# Patient Record
Sex: FEMALE | Race: BLACK OR AFRICAN AMERICAN | NOT HISPANIC OR LATINO | Employment: UNEMPLOYED | URBAN - METROPOLITAN AREA
[De-identification: names, ages, dates, MRNs, and addresses within clinical notes are randomized per-mention and may not be internally consistent; named-entity substitution may affect disease eponyms.]

---

## 2017-01-04 ENCOUNTER — TELEPHONE (OUTPATIENT)
Dept: ENDOSCOPY | Facility: HOSPITAL | Age: 62
End: 2017-01-04

## 2017-01-23 ENCOUNTER — OFFICE VISIT (OUTPATIENT)
Dept: GASTROENTEROLOGY | Facility: CLINIC | Age: 62
End: 2017-01-23
Payer: MEDICAID

## 2017-01-23 VITALS
BODY MASS INDEX: 28.95 KG/M2 | RESPIRATION RATE: 18 BRPM | HEIGHT: 66 IN | HEART RATE: 80 BPM | SYSTOLIC BLOOD PRESSURE: 115 MMHG | DIASTOLIC BLOOD PRESSURE: 79 MMHG | WEIGHT: 180.13 LBS

## 2017-01-23 DIAGNOSIS — K31.89 RETAINED FOOD IN STOMACH: ICD-10-CM

## 2017-01-23 DIAGNOSIS — K21.9 GASTROESOPHAGEAL REFLUX DISEASE WITHOUT ESOPHAGITIS: Primary | ICD-10-CM

## 2017-01-23 PROCEDURE — 99214 OFFICE O/P EST MOD 30 MIN: CPT | Mod: PBBFAC | Performed by: NURSE PRACTITIONER

## 2017-01-23 PROCEDURE — 99999 PR PBB SHADOW E&M-EST. PATIENT-LVL IV: CPT | Mod: PBBFAC,,, | Performed by: NURSE PRACTITIONER

## 2017-01-23 PROCEDURE — 99214 OFFICE O/P EST MOD 30 MIN: CPT | Mod: S$PBB,,, | Performed by: NURSE PRACTITIONER

## 2017-01-23 NOTE — PROGRESS NOTES
[facility] Note    Reason for Visit:  Gastroesophageal reflux    PCP: Primary Doctor No     Referring MD:     HPI:    This is a 61 y.o. female here for f/u evaluation of gastroesophageal reflux and dysphagia.  I saw Ms. Stevens in November for worsening of reflux. She is a former pt of [facility], but had to switch to [facility] d/t insurance coverage issues. EGD last month revealed a normal esophagus, a 2 cm hiatal hernia, a normal duodenum and a medium amount of food residue in the stomach. She has been taking 40 mg of omeprazole once daily on an empty stomach 1 hr before breakfast with improvement in s/s. The dysphagia has resolved and she has breakthrough reflux occurring less than weekly. In fact, she has not had any breakthrough s/s for the last 21 days since being on a fast from unhealthy foods.     Abdominal pain - no  NSAID usage - advil for back pain once weekly.  Hx of BC goody powders 1-2 times per month for HAs-last usage one month ago. Previously advised to avoid.  Bowel habits - hx constipation. Currently with 1-2 formed BM/day taking 17 g Miralax once daily.   GI bleeding - denies hematochezia, hematemesis, melena, BRBPR, black/tarry stools, and coffee ground emesis    ROS:  Constitutional: No fevers, no chills, No unintentional weight loss, no fatigue,   ENT: + allergies. Will be seeing ENT soon  CV: No chest pain, no palpitations, no perif. edema, no sob on exertion  Pulm: No cough, No shortness of breath, no wheezes, no sputum  Ophtho: No vision changes  GI: see HPI; also no nausea, no vomiting, no change in appetite  Derm: No rash  Heme: No lymphadenopathy, No bruising  MSK: No arthritis, + muscle pain (back), no muscle weakness  : No dysuria, No hematuria  Endo: No hot or cold intolerance  Neuro: No syncope, No seizure,     Medical History:  has a past medical history of GERD (gastroesophageal reflux disease); HTN (hypertension); MVP (mitral valve prolapse); and Nodules of  vocal cords.    Surgical History:  has a past surgical history that includes Finger surgery (Right); Colonoscopy; and Esophagogastroduodenoscopy.    Family History: family history includes Breast cancer in her mother and sister; Colon cancer in her sister. There is no history of Vaginal cancer, Endometrial cancer, Cervical cancer, Ovarian cancer, Cancer, Esophageal cancer, Stomach cancer, Rectal cancer, Irritable bowel syndrome, Crohn's disease, Ulcerative colitis, or Celiac disease..     Social History:  reports that she has quit smoking. Her smoking use included Cigarettes. She does not have any smokeless tobacco history on file. She reports that she drinks about 0.6 oz of alcohol per week  She reports that she does not use illicit drugs.    Review of patient's allergies indicates:   Allergen Reactions    Penicillins        Current Outpatient Prescriptions   Medication Sig    atorvastatin (LIPITOR) 40 MG tablet Take 40 mg by mouth once daily.    benzonatate (TESSALON) 200 MG capsule Take 200 mg by mouth 3 (three) times daily as needed for Cough.    clotrimazole (LOTRIMIN) 1 % cream Apply topically 2 (two) times daily.    conj estrogens-bazedoxifene (DUAVEE) 0.45-20 mg Tab Take by mouth once daily.    diazePAM (VALIUM) 5 MG tablet Take 5 mg by mouth every 6 (six) hours as needed for Anxiety.    losartan-hydrochlorothiazide 100-12.5 mg (HYZAAR) 100-12.5 mg Tab Take 1 tablet by mouth once daily.    metoprolol tartrate (LOPRESSOR) 100 MG tablet Take 100 mg by mouth 2 (two) times daily.    omeprazole (PRILOSEC) 40 MG capsule Take 1 capsule (40 mg total) by mouth every morning. 30-45 min before breakfast.    vitamin D 1000 units Tab Take 185 mg by mouth once daily.    oxybutynin (DITROPAN-XL) 10 MG 24 hr tablet Take 1 tablet (10 mg total) by mouth once daily.     No current facility-administered medications for this visit.        Objective Findings:    Vital Signs:  Visit Vitals    /79    Pulse 80     "Resp 18    Ht 5' 6" (1.676 m)    Wt 81.7 kg (180 lb 1.9 oz)    BMI 29.07 kg/m2     Body mass index is 29.07 kg/(m^2).    Physical Exam:  General Appearance: Well appearing in no acute distress  Head:   Normocephalic, without obvious abnormality  Eyes:    No scleral icterus, EOMI  ENT: Neck supple, Lips, mucosa, and tongue normal; teeth and gums normal  Extremities: 2+ radial pulses, no clubbing, cyanosis or edema  Skin: No rash to exposed areas  Neurologic: A&Ox4      Labs:  No results found for: WBC, HGB, HCT, PLT, CHOL, TRIG, HDL, LDLDIRECT, ALT, AST, NA, K, CL, CREATININE, BUN, CO2, TSH, PSA, INR, GLUF, HGBA1C, MICROALBUR      Endoscopy:    12/28/2016 EGD Dr. Mitchell- normal esophagus. Medium amount of food residue in the stomach. 2 cm HH. Normal duodenum.   Per pt, 7/2016 [facility]- polyp removed. Repeat in 5 years  Per pt, 8/22/2014 EGD Dr. Lock @ [facility]- HH. Polyp in the GE junction. Normal duodenum. Normal antrum. Ring in the GE junction.  EGD report reviewed during today's clinic visit. Copies sent to be scanned into pt EMR.  Assessment:  1. Gastroesophageal reflux disease without esophagitis    2. Retained food in stomach           Recommendations:  1. GERD-better. Continue PPI and GERD healthy lifestyle.   2. Retained food in the stomach- GES r/o delayed gastric emptying.     F/u pending results.     Visit time: 30 minutes with greater than 50% of the time spent in face to face pt  discussing the aforementioned diagnoses, recommendations, test results, and answering pt questions.      Order summary:  Orders Placed This Encounter    NM Gastric Emptying         Thank you so much for allowing me to participate in the care of Marta Varghese-Naga Pichardo, APRN,FNP-C    "

## 2017-01-23 NOTE — PATIENT INSTRUCTIONS
"     Probiotics      For IBS and bloating, we typically recommend Align, VSL#3, or [facility] Colon [facility], which can typically be found without a prescription at the pharmacy. Give this at least  a month to work, but can take up to 3 months to repopulate your intestines, so be patient!     VSL#3 is a potent probiotic which can be found in pill form (try [employer] for best price, or VSL3.com). $52 for a 2 month supply. The sachets are for ulcerative colitis and require a prescription.    If you go on the internet, a reputable/powerful probiotic appears to be Super Shield from [employer] at: http://www.bluerockholistics.CarHound/product/pross.asp  You can also choose PB 8 which can be found at [employer] or online.    For diarrhea from travel or antibiotics, we typically recommend Culturelle or Florastor (especially for diarrhea from C diff infection).         General information:  Pick one that has at least seven strains, and five billion CFU (colony forming units).    Products containing probiotics have flooded the market in recent years. As more people seek natural or non-drug ways to maintain their health, manufacturers have responded by offering probiotics in everything from yogurt to chocolate and granola bars to powders and capsules.    Although probiotics have been around for generations - think of the "live active cultures"in several brands of yogurt - the sheer number of products with probiotics now available may overwhelm even the most conscientious of shoppers. In some respects, the industry has grown faster than the research and scientists and doctors are calling for more studies to help determine which probiotics are beneficial and which might be a waste of money.    What Are Probiotics?  Probiotics are living microscopic organisms, or micro-organisms, that scientific research has shown to benefit your health. Most often they are bacteria, but they may also be other organisms such as yeasts. In some " cases they are similar, or the same, as the good bacteria already in your body, particularly those in your gut.    The most common probiotic bacteria come from two groups, Lactobacillus or Bifidobacterium, although it is important to remember that there are many other types of bacteria that are also classified as probiotics. Each group of bacteria has different species and each species has different strains. This is important to remember because different strains have different benefits for different parts of your body. For example, Lactobacillus casei Shirota has been shown to support the immune system and to help food move through the gut, but Lactobacillus bulgaricus may help relieve symptoms of lactose intolerance, a condition in which people cannot digest the lactose found in most milk and dairy products. In general, not all probiotics are the same, and they dont all work the same way.    Scientists are still sorting out exactly how probiotics work. They may:       Boost your immune system by producing antibodies for certain viruses.  Produce substances that prevent infection.  Prevent harmful bacteria from attaching to the gut wall and growing there.  Send signals to your cells to strengthen the mucus in your intestine and help it act as a barrier against infection.  Inhibit or destroy toxins released by certain bad bacteria that can make you sick.  Produce B vitamins necessary for metabolizing the food you eat, warding off anemia caused by deficiencies in B-6 and B-12, and maintaining healthy skin and a healthy nervous system.      Common Uses  Probiotics are most often used to promote digestive health. Because there are different kinds of probiotics, it is important to find the right one for the specific health benefit you seek. Researchers are still studying which probiotic should be used for which health or disease state. Nevertheless, probiotics have been shown to help regulate the movement of food  through the intestine. They also may help treat digestive disease, something of much interest to gastroenterologists. Some of the most common uses for probiotics include the treatment of the following:    Irritable Bowel Syndrome    Irritable bowel syndrome (IBS) is a disorder of movement in the gut. People who have IBS may have diarrhea, constipation or alternating bouts of both. IBS is not caused by injury or illness. Often the only way doctors can diagnose it is to rule out other conditions through testing.    Probiotics, particularly Bifidobacterium infantis, Sacchromyces boulardii, Lactobacillus plantarum and combination probiotics may help regulate how often people with IBS have bowel movements. Probiotics may also help relieve bloating from gas. Bifidobacterium infantis 52319, Lactobacillus plantarum 299V or Bifidobacterium bifidum [id number] have been shown to help regulate bowel movements and relieve bloating, pain and gas.    Inflammatory Bowel Disease    Though some of the symptoms are the same, inflammatory bowel disease (IBD) is different from IBS because in IBD, the intestines become inflamed. Unlike IBS, IBD is a disorder of the immune system. Symptoms include abdominal cramps, pain, diarrhea, weight loss and blood in your stools. In Crohns disease, ulcers may develop anywhere in your intestine including both the large and small bowels. In ulcerative colitis, inflammation only involves the large intestine. Bouts of inflammation may come and go, but in some cases, prescription medication is needed to keep inflammation in check.        Some studies suggest that probiotics may help decrease inflammation and delay the next bout of disease. Ulcerative colitis seems to respond better to probiotics than Crohns disease does. So far, E. coli Nissle, and a mixture of several strains of Lactobacillus, Bifidobacterium and Streptococcus may be most beneficial. Research is continuing to determine which probiotics  are best to treat IBD.    Infectious Diarrhea    Infectious diarrhea is caused by bacteria, viruses or parasites. There is evidence that probiotics such as Lactobacillus rhamnosus and Lactobacillus casei may be particularly helpful in treating diarrhea caused by rotavirus, which often affects babies and small children. Several strains of Lactobacillus and a strain of the yeast Saccharomyces boulardii may help treat and shorten the course of infectious diarrhea.    Antibiotic-Related Diarrhea  Take Lactobacillus rhamnosus GG and/or Saccharomyces boulardii (Culturelle and/or Florastor) six hours after each dose of antibiotics. Increase the dose to 10 billion CFUs per day and continue for one to two weeks after you stop taking the antibiotic.    Sometimes taking an antibiotic can cause infectious diarrhea by reducing the number of good microorganisms in your gut. Then bacteria that normally do not give you any trouble can grow out of control. One such bacterium is Clostridium difficile, which is a major cause of diarrhea in hospitalized patients and people in long-term care facilities like nursing homes. The trouble with Clostridium difficile is that it tends to come back, but there is evidence that taking probiotics such as Saccharomyces boulardii may help prevent this. There is also evidence that taking probiotics when you first start taking an antibiotic may help prevent antibiotic-related diarrhea in the first place.    Travelers Diarrhea    Its possible to get infectious diarrhea when you travel and your body is exposed to new, normally harmless bacteria (travelers diarrhea). Most studies show that probiotics are not very effective in preventing or treating travelers diarrhea in adults. Taking Saccharomyces boulardii weeks before your trip may help prevent travelers diarrhea, which usually comes from ingesting food or water thats been contaminated with bacteria.    Other Uses    Other potential uses for  probiotics include maintaining a healthy mouth, preventing and treating certain skin conditions like eczema, promoting health in the urinary tract and vagina, and preventing allergies (especially in children). There is not as much research about these uses as there is about the benefits of probiotics for your digestive system, and studies have had mixed results. If you have eczema ?Lactobacillus rhamnosus  and Lactobacillus fermentum VRI-003 PCC have been shown to help treat those itchy, scaly skin rashes -- especially in children.If you have a cold ?Some research suggests that Bifidobacterium animalis lactis Bi-07 and Lactobacillus acidophilus NCFM can help reduce the duration and severity of the common cold and flu by enhancing the bodys production of antibodies. If you have a vaginal infection ?Lactobacillus acidophilus, Lactobacillus rhamnosus GR-1 and Lactobacillus reuteri RC-14 have been shown to help prevent and clear up bacterial vaginosis and urinary tract infections in some individuals. Researchers point to Lactobacillus reuteri RC-14 and Lactobacillus rhamnosus GR-1 as the most effective stains to protect against yeast infections as theyre especially adept at colonizing the vaginal environment and fighting off unwelcome bacteria and fungi. If you have bad breath, gingivitis or periodontitis ?A probiotic lozenge or mouthwash might be your best bet. Lactobacillus reuteri LR-1 or LR-2 promote oral health by binding to teeth and gums, preventing plaque formation in the mouth. Research has demonstrated the ability of Weissella cibaria to freshen breath by inhibiting the production of sulfur compounds in the mouth.    Are Probiotics Safe?  It is generally thought that most probiotics are safe, although it is not yet known if they are safe for people with severely impaired immune systems. They may be taken by people without a diagnosed digestive problem. Their safety is evident since they have a long  history of use in dairy foods like yogurt, cheese and milk.    However, you should talk to your doctor before adding these or any other probiotics to your diet. Probiotics might not be appropriate for seniors. Some probiotics may interfere with or interact with medications. Your doctor will be able to help you determine if probiotics are right for you based on your medical history.    Research about the use of probiotics in children has grown in recent years. Although studies have shown that probiotics may help to treat infectious diarrhea in babies and small children, researchers are unsure whether probiotics are particularly helpful for children with Crohns disease or other types of inflammatory bowel disease. Ask your childs pediatrician about probiotics before giving them to your child.    The exception here is breastfeeding. Breast milk stimulates the growth of normal gut organisms that are important for a babys digestive health and developing immune system. That is one reason why doctors strongly encourage mothers to breastfeed their babies.    Overall, scientists agree that more research is necessary before they can make blanket statements about the safety of probiotics in general or about individual groups and strains. Future studies will show whether probiotics can be used to treat diseases, are safe to use for a long time, and if it is possible to take too many probiotics or mix them in inappropriate ways. These studies will also guide us as to which probiotics to use for different disorders.    Keep in mind that probiotics are considered dietary supplements and are not FDA-regulated like drugs. They are not standardized, meaning they are made in different ways by different companies and have different additives. How well a probiotic works may differ from brand to brand and even from batch to batch within the same brand. Probiotics also vary tremendously in their cost, and cost does not necessarily  reflect higher quality.    Side effects may vary, too. The most common are gas and bloating. These are usually mild and temporary. More serious side effects include allergic reactions, either to the probiotics themselves or to other ingredients in the food or supplement.    Choosing a Probiotic  Probiotics are available in yogurt and other dairy products, chocolate and granola bars, juices, powders, and capsules. You can purchase them at your local supermarket or [employer] food store as well as on the Internet. Here are some tips to help you choose.    Check the label. The more information there is on the label, the better. Ideally, the label will tell you the probiotics group, species and strain, and how many of the microorganisms will still be alive on the use-by date. Although some products guarantee how many organisms were present at the time it was manufactured, often it is less clear how many organisms are present when these products are actually consumed.    Call the . Unfortunately, many labels dont say exactly which strain is in the product; many list only the group and the species, such as Lactobacillus acidophilus or Bifidobacterium lactis. If youre planning to take a probiotic for a specific condition, call the company and find out exactly which strains its products contain and what research they have done to support their health claims. You may be able to find this information on their Web site, as well.    Beware of the Internet. If you order products from the Internet, make sure you know the company from which you are ordering. There are plenty of scammers out there who are willing to send you fake products labeled as probiotics. At best, the ingredients could be harmless, like garlic powder. At worst, they could be laced with powerful herbs, prescription medications or illegal drugs. Some companies may simply take your money and disappear.    Stick to well-established companies and  companies you know. The longer a company has been around, the more likely its products have been tested and studied repeatedly and the bigger the reputation the company has to protect. Some manufacturers that have been making products with probiotics for a while are [employer], [employer], [employer], [employer], [employer], [employer], [employer], and [employer].    Storage    One final note: Remember to store your probiotic according to package instructions and make sure the product has a sell-by or expiration date. Probiotics are living organisms. Even if they are dried and dormant, like in a powder or capsule, they must be stored properly or they will die. Some require refrigeration whereas others do not. They also have a shelf-life, so make sure you use them before the expiration date on the package.      NSAIDS are Non Steroidal Anti Inflammatory Drugs taken for pain.   You should stay away from these medications (unless they are advised due to cardiac health issues) as they may cause irritation and/or ulceration in the lining of your stomach.  Ask your primary care physician for an alternative medication.    Powders - BC Powder, Goody powder, Stanback    Ibuprofen which is also known as : Advil, Advil Childrens, Advil Parveen Strength, Advil Liquigel, Advil Migraine, Advil Pediatric, Childrens Ibuprofen Berry, Genpril, IBU, Midol IB, Midol Maximum Strength Cramp Formula, Dolgesic, Motrin Childrens, Motrin IB, Motrin Infant Drops, Motrin Parveen Strength, Motrin Migraine Pain, Nuprin, Migraine Liqui-gels, Ibu-Tab 200, Cap-Profen, Tab-Profen, Profen, Ibuprohm, Childrens Elixsure, IB Pro, Vicoprofen, Combunox, A-G Profen, Actiprofen, Addaprin, Advil Infants Concentrated Drops, Caldolor, Haltran, Q-Profen, Ibifon 600, Ibren, Menadol, Midol Cramps & Bodyaches, Rufen, Saleto-200, Michael, Ultraprin, Uni-Pro, Wal-Profen., Famotidine and Ibuprofen can be found as : Duexis.    Aspirin which has the brand name :  Arthritis Pain, Aspergum, Aspir-Low, Aspirin Lite Coat, Samy Aspirin, Bufferin, Easprin, Ecotrin, Empirin, Fasprin, Genacote, Halfprin, [place] Aspirin, Waldo Aspirin, Stanback Analgesic, Tri-Buffered Aspirin, YSP Aspirin, Zorprin.    Ketoprofen which is  known as : Orudis KT, Oruvail, Actron.    Sulindac which is sold as : Clinoril.    Naproxen is one of the most known drug from NSAIDs list, and is sold as : Aleve, Naprosyn, EC-Naprosyn, Naprelan, Anaprox, All Day Pain Relief, Aflaxen, All Day Relief, Anaprox-DS, Comfort Pac  With Naproxen,  Aleve Caplet, Aleve Easy Open Arthritis, Aleve Gelcap,  Anaprox-DS, EC-Naprosyn, Leader Naproxen Sodium, Midol Extended Relief, Naprelan 375?, Naprelan 500?, Naprelan 750?, Prevacid NapraPac 375,  Prevacid NapraPac, Naprapac, Vimovo.    Etodolac is sold under the brand name : Lodine XL, Lodine.  Fenoprofen which can be found on the market as : Nalfon, Nalfon 200.  Diclofenac which is sold as : Arthrotec, Cataflam, Voltaren, Cambia, Voltaren-XR, Zipsor.  Flurbiprofen sold as : Asaid.  Ketorolac is sold under the brand name : Sprix, Toradol, Toradol IM, Toradol IV/IM.  Piroxicam is found on the market as : Feldene.  Indomethacin which is known as : Indocin SR, Indocin, Indo-[place], Indomethegan, Indocin IV.  Mefenamic Acid sold as : Ponstel.  Meloxicam is sold under the brand name : Mobic.  Nabumetone which is sold as : Relafen.  Oxaprozin which has the brand name : Daypro.  Ketoprofen which has the brand name : Actron, Orudis KT, Orudis, Oruvail.  Meclofenamate sold as : Meclomen.  Tolmetin which can be found on the marked as : Tolectin, Tolectin 600, Tolectin DS.  Salsalate which is sold as : Disalcid.

## 2017-02-03 ENCOUNTER — HOSPITAL ENCOUNTER (OUTPATIENT)
Dept: RADIOLOGY | Facility: HOSPITAL | Age: 62
Discharge: HOME | End: 2017-02-03
Attending: NURSE PRACTITIONER
Payer: MEDICAID

## 2017-02-03 DIAGNOSIS — K21.9 GASTROESOPHAGEAL REFLUX DISEASE WITHOUT ESOPHAGITIS: ICD-10-CM

## 2017-02-03 DIAGNOSIS — K31.89 RETAINED FOOD IN STOMACH: ICD-10-CM

## 2017-02-03 PROCEDURE — 78264 GASTRIC EMPTYING IMG STUDY: CPT | Mod: TC

## 2017-02-03 PROCEDURE — 78264 GASTRIC EMPTYING IMG STUDY: CPT | Mod: 26,,, | Performed by: RADIOLOGY

## 2017-02-06 ENCOUNTER — TELEPHONE (OUTPATIENT)
Dept: GASTROENTEROLOGY | Facility: CLINIC | Age: 62
End: 2017-02-06

## 2017-02-06 NOTE — TELEPHONE ENCOUNTER
Please call Ms. Stevens and let her know her GES was normal. She should f/u yearly (1/2018) for long term PPI use.    Thanks,  Tiff, NP

## 2017-03-06 ENCOUNTER — TELEPHONE (OUTPATIENT)
Dept: UROGYNECOLOGY | Facility: CLINIC | Age: 62
End: 2017-03-06

## 2017-03-06 ENCOUNTER — OFFICE VISIT (OUTPATIENT)
Dept: UROGYNECOLOGY | Facility: CLINIC | Age: 62
End: 2017-03-06
Payer: MEDICAID

## 2017-03-06 VITALS
BODY MASS INDEX: 29.23 KG/M2 | DIASTOLIC BLOOD PRESSURE: 78 MMHG | HEIGHT: 66 IN | SYSTOLIC BLOOD PRESSURE: 122 MMHG | WEIGHT: 181.88 LBS

## 2017-03-06 DIAGNOSIS — N95.2 VAGINAL ATROPHY: ICD-10-CM

## 2017-03-06 DIAGNOSIS — N39.46 MIXED INCONTINENCE: Primary | ICD-10-CM

## 2017-03-06 DIAGNOSIS — N81.11 MIDLINE CYSTOCELE: ICD-10-CM

## 2017-03-06 DIAGNOSIS — N95.0 POST-MENOPAUSAL BLEEDING: ICD-10-CM

## 2017-03-06 DIAGNOSIS — N81.6 RECTOCELE: ICD-10-CM

## 2017-03-06 LAB
BILIRUB SERPL-MCNC: ABNORMAL MG/DL
BLOOD URINE, POC: ABNORMAL
COLOR, POC UA: YELLOW
GLUCOSE UR QL STRIP: ABNORMAL
KETONES UR QL STRIP: ABNORMAL
LEUKOCYTE ESTERASE URINE, POC: 1
NITRITE, POC UA: ABNORMAL
PH, POC UA: 5
PROTEIN, POC: ABNORMAL
SPECIFIC GRAVITY, POC UA: 1.01
UROBILINOGEN, POC UA: ABNORMAL

## 2017-03-06 PROCEDURE — 99213 OFFICE O/P EST LOW 20 MIN: CPT | Mod: S$PBB,,, | Performed by: NURSE PRACTITIONER

## 2017-03-06 PROCEDURE — 99213 OFFICE O/P EST LOW 20 MIN: CPT | Mod: PBBFAC | Performed by: NURSE PRACTITIONER

## 2017-03-06 PROCEDURE — 81002 URINALYSIS NONAUTO W/O SCOPE: CPT | Mod: PBBFAC | Performed by: NURSE PRACTITIONER

## 2017-03-06 PROCEDURE — 99999 PR PBB SHADOW E&M-EST. PATIENT-LVL III: CPT | Mod: PBBFAC,,, | Performed by: NURSE PRACTITIONER

## 2017-03-06 RX ORDER — TERCONAZOLE 4 MG/G
1 CREAM VAGINAL DAILY
COMMUNITY
End: 2018-01-22

## 2017-03-06 RX ORDER — METOPROLOL SUCCINATE 100 MG/1
50 TABLET, EXTENDED RELEASE ORAL DAILY
COMMUNITY

## 2017-03-06 NOTE — TELEPHONE ENCOUNTER
"Spoke with pt and let her know she can print her name where it says "pay to order" and make sure you put your signature on the back. Pt. Voice understanding call ended.      "

## 2017-03-06 NOTE — PATIENT INSTRUCTIONS
1. Prolapse: Stage 2 anterior and posterior vaginal wall prolapse (not very bothersome to patient)  --Pessary # 3 ring without support  Not being used at this time.   PESSARY CARE: Remove pessary as frequently as nightly and as infrequently as every 2-3 weeks. Remove before intercourse. May need to remove before bowel movements if straining dislodges. Wash with soap and water (no ). Replace using small amount of water-based lubricant (like KY jelly) at end being introduced into vagina.    2. 1)  Mixed urinary incontinence, urge > stress:    --Empty bladder every 3 hours.  Empty well: wait a minute, lean forward on toilet.    --Avoid dietary irritants (see sheet).  Keep diary x 3-5 days to determine your irritants.  --KEGELS: do 10 in AM and 10 in PM, holding each x 10 seconds.  When you feel urge to go, STOP, KEGEL, and when urge has passed, then go to bathroom.  Consider PT in future.    --URGE: continue oxybutynin.  For dry mouth: get sour, sugar free lozenge or gum.    --STRESS:  Pessary vs. Sling.     3. Post menopausal bleeding  --pelvic ultrasound ordered  --misses duavee pills often.    4. RTC pending results

## 2017-03-06 NOTE — TELEPHONE ENCOUNTER
----- Message from Leny Leonard sent at 3/6/2017 12:44 PM CST -----  Contact: CHARLES PETER [[id number]]  x_  1st Request  _  2nd Request  _  3rd Request        Who: CHARLES PETER [[id number]]    Why: patient states she needs a call back in regards to a check she received at her visit today     What Number to Call Back: [phone]    When to Expect a call back: (Before the end of the day)   -- if call after 3:00 call back will be tomorrow.

## 2017-03-06 NOTE — PROGRESS NOTES
Urogyn follow up  03/06/2017  .  [facility]  [address] [address]    Marta Stevens  [id number]  1955      Marta Stevens is a 61 y.o.  here for a urogyn follow up.    Last HPI from 11/29/2017  History of Present Illness: 60 Y O F patient of Dr. Lara here for evaluation of urinary incontinence. Patient unable to remove the pessary requesting additional education.         HPI         [facility]       Question    11/1/2016 10:38 AM CDT      General Urogynecology: Are you experiencing the following?          Dysuria (painful urination)   No      Nocturia:  waking up at night to empty your bladder    Yes      If you answered yes to the previous question, how many times does this happen per night?   1-2      Enuresis (urine loss during sleep)   No      Dribbling urine after you urinate   No      Hematuria (urine appears red)   No      Type of stream   Strong      Urinary Incontinence (General): Are you experiencing the following?          Past consultation for incontinence: Have you ever seen someone for the evaluation of incontinence?   Yes      If you answered yes to the previous question, please select all the therapies you have tried.    None of the above      Please note the effectiveness of the therapies.   Ineffective      Need to wear protection to keep clothes dry    No      If you answered yes to the previous question, please mykel the protection you use.    None      If you wear protection, how much wetness is typically on each pad?   Panti liner       If you wear protection, how often do you have to change per day, if applicable?    0      Stress Symptoms: Are you experiencing the following?          Leakage of urine with cough, laugh and/or sneeze   No      If you answered yes to the previous question, what is the frequency in days, weeks and/or months?   Never      Leakage of urine with sex   No      Leakage of urine with bending/ lifting   No      Leakage  "of urine with briskly walking or jogging   No      If you lose urine for any other reason not previously mentioned, please note it below, if applicable.    none      Urge Symptoms: Are you experiencing the following?          Urgency ("got to go" feeling)   Yes      Urge: How frequently do you feel an urge to urinate (feeling like you "gotta go" to the bathroom and can't wait)   Daily      Do you experience a leakage of urine when you have a feeling of urgency?    Yes      Leakage of urine when unaware   No      Past use of anticholinergics (medications used to treat overactive bladder)   No      If you answered yes to the previous question, please mykel the anticholinergics you have used:           Have you ever used Mirbetriq (aka Mirabegron)?    No      Prolapse Symptoms: Are you experiencing any of the following?           Falling out/ Bulging/ Heaviness in the vagina   No      Vaginal/ Abdominal Pain/ Pressure   No      Need to strain/ Push to void   No      Need to wait on the toilet before you void   No      Unusual position to urinate (using your hands to push back the vaginal bulge)   No      Sensation of incomplete emptying   Yes      Past use of pessary device   No      If you answered yes to the previous question, please list the devices you have used below.           Bowel Symptoms: Are you experiencing any of the following?          Constipation   No      Diarrhea    No      Hematochezia (bloody stool)   No      Incomplete evacuation of stool   No      Involuntary loss of formed stool   No      Fecal smearing/urgency   No      Involuntary loss of gas   No      Vaginal Symptoms: Are you experiencing any of the following?           Abnormal vaginal bleeding    No      Vaginal dryness   Yes      Sexually active    No      Dyspareunia (painful intercourse)   No      Estrogen use    No       Changes from last visit:  1)  UI:  (--) ALMA ROSA   (+) UUI  Better with oxybutynin (+) pads:1/day, usually minimum wetness  " Daytime frequency: Q 2-3 hours.  Nocturia: Yes: 1/night.   (--) dysuria,  (--) hematuria,  (--) frequent UTIs.  (+) complete bladder emptying.     2)  POP:  Absent.  Symptoms:(--)  .  (+) vaginal bleeding--- few weeks ago bled x 4 days. (--) vaginal discharge. (--) sexually active.   (+)  Vaginal dryness.  (--) vaginal estrogen use. Using duavee. Reports missing doses frequently    3)  BM:  (--) constipation/straining.  (--) chronic diarrhea. (--) hematochezia.  (--) fecal incontinence.  (--) fecal smearing/urgency.  (--) incomplete evacuation.  Using miralax.    4)Pessary-- not using now      Past Medical History:   Diagnosis Date    GERD (gastroesophageal reflux disease)     HTN (hypertension)     MVP (mitral valve prolapse)     Nodules of vocal cords        Past Surgical History:   Procedure Laterality Date    COLONOSCOPY      ESOPHAGOGASTRODUODENOSCOPY      FINGER SURGERY Right        Current Outpatient Prescriptions   Medication Sig    atorvastatin (LIPITOR) 40 MG tablet Take 40 mg by mouth once daily.    clotrimazole (LOTRIMIN) 1 % cream Apply topically 2 (two) times daily.    conj estrogens-bazedoxifene (DUAVEE) 0.45-20 mg Tab Take by mouth once daily.    diazePAM (VALIUM) 5 MG tablet Take 5 mg by mouth every 6 (six) hours as needed for Anxiety.    losartan-hydrochlorothiazide 100-12.5 mg (HYZAAR) 100-12.5 mg Tab Take 1 tablet by mouth once daily.    metoprolol succinate (TOPROL-XL) 100 MG 24 hr tablet Take 100 mg by mouth once daily.    metoprolol tartrate (LOPRESSOR) 100 MG tablet Take 100 mg by mouth 2 (two) times daily.    omeprazole (PRILOSEC) 40 MG capsule Take 1 capsule (40 mg total) by mouth every morning. 30-45 min before breakfast.    terconazole (TERAZOL 7) 0.4 % Crea Place 1 applicator vaginally once daily.    vitamin D 1000 units Tab Take 185 mg by mouth once daily.    oxybutynin (DITROPAN-XL) 10 MG 24 hr tablet Take 1 tablet (10 mg total) by mouth once daily.     No current  "facility-administered medications for this visit.          ROS:  As per HPI.      Exam  /78 (BP Location: Right arm, Patient Position: Sitting, BP Method: Manual)  Ht 5' 6" (1.676 m)  Wt 82.5 kg (181 lb 14.1 oz)  BMI 29.36 kg/m2  General: alert and oriented, no acute distress  Respiratory: normal respiratory effort  Abd: soft, non-tender, non-distended    Pelvic  Ext. Genitalia: normal external genitalia. Normal bartholin's and skeens glands  Vagina: + atrophy. Normal vaginal mucosa without lesions. No discharge noted.   Non-tender bladder base without palpable mass.  Cervix: no lesions  Uterus:  uterus is normal size, shape, consistency and nontender   Urethra: no masses or tenderness  Urethral meatus: no lesions, caruncle or prolapse.        Impression  1. Mixed incontinence  POCT URINE DIPSTICK WITHOUT MICROSCOPE   2. Post-menopausal bleeding  US Pelvis Comp with Transvag NON-OB (xpd)    CANCELED: US Pelvis Complete Non OB   3. Midline cystocele     4. Rectocele     5. Vaginal atrophy       We reviewed the above issues and discussed options for short-term versus long-term management of her problems.   Plan:        1. Prolapse: Stage 2 anterior and posterior vaginal wall prolapse (not very bothersome to patient)  --Pessary # 3 ring without support  Not being used at this time.   PESSARY CARE: Remove pessary as frequently as nightly and as infrequently as every 2-3 weeks. Remove before intercourse. May need to remove before bowel movements if straining dislodges. Wash with soap and water (no ). Replace using small amount of water-based lubricant (like KY jelly) at end being introduced into vagina.    2. 1)  Mixed urinary incontinence, urge > stress:    --Empty bladder every 3 hours.  Empty well: wait a minute, lean forward on toilet.    --Avoid dietary irritants (see sheet).  Keep diary x 3-5 days to determine your irritants.  --KEGELS: do 10 in AM and 10 in PM, holding each x 10 seconds.  When " you feel urge to go, STOP, KEGEL, and when urge has passed, then go to bathroom.  Consider PT in future.  --URGE: continue oxybutynin.  For dry mouth: get sour, sugar free lozenge or gum.    --STRESS:  Pessary vs. Sling.     3. Post menopausal bleeding  --pelvic ultrasound ordered  --misses duavee pills often.    4. RTC pending results      30 minutes were spent in face to face time with this patient  75 % of this time was spent in counseling and/or coordination of care    OLIMPIA Lai-BC [facility]  Division of Female Pelvic Medicine and Reconstructive Surgery  Department of Obstetrics & Gynecology

## 2017-03-06 NOTE — MR AVS SNAPSHOT
[facility]  [address] [address]  Phone: [phone]                  Marta Stevens   3/6/2017 11:00 AM   Office Visit    Description:  Female : 1955   Provider:  Elda Loweyr NP   Department:  [facility]           Reason for Visit     Pessary Check           Diagnoses this Visit        Comments    Mixed incontinence    -  Primary     Post-menopausal bleeding                To Do List           Future Appointments        Provider Department Dept Phone    3/8/2017 1:15 PM [facility] [address] [facility] [phone]      Goals (5 Years of Data)     None      [facility]     [facility] Nurse Care Line -  Assistance  Registered nurses in the [facility] provide clinical advisement, health education, appointment booking, and other advisory services.  Call for this free service at [phone].             Medications           Message regarding Medications     Verify the changes and/or additions to your medication regime listed below are the same as discussed with your clinician today.  If any of these changes or additions are incorrect, please notify your healthcare provider.        STOP taking these medications     benzonatate (TESSALON) 200 MG capsule Take 200 mg by mouth 3 (three) times daily as needed for Cough.           Verify that the below list of medications is an accurate representation of the medications you are currently taking.  If none reported, the list may be blank. If incorrect, please contact your healthcare provider. Carry this list with you in case of emergency.           Current Medications     atorvastatin (LIPITOR) 40 MG tablet Take 40 mg by mouth once daily.    clotrimazole (LOTRIMIN) 1 % cream Apply topically 2 (two) times daily.    conj estrogens-bazedoxifene (DUAVEE) 0.45-20 mg Tab Take by mouth once daily.    diazePAM (VALIUM) 5 MG tablet Take 5 mg by mouth every 6 (six)  "hours as needed for Anxiety.    losartan-hydrochlorothiazide 100-12.5 mg (HYZAAR) 100-12.5 mg Tab Take 1 tablet by mouth once daily.    metoprolol succinate (TOPROL-XL) 100 MG 24 hr tablet Take 100 mg by mouth once daily.    metoprolol tartrate (LOPRESSOR) 100 MG tablet Take 100 mg by mouth 2 (two) times daily.    omeprazole (PRILOSEC) 40 MG capsule Take 1 capsule (40 mg total) by mouth every morning. 30-45 min before breakfast.    terconazole (TERAZOL 7) 0.4 % Crea Place 1 applicator vaginally once daily.    vitamin D 1000 units Tab Take 185 mg by mouth once daily.    oxybutynin (DITROPAN-XL) 10 MG 24 hr tablet Take 1 tablet (10 mg total) by mouth once daily.           Clinical Reference Information           Your Vitals Were     BP Height Weight BMI       122/78 (BP Location: Right arm, Patient Position: Sitting, BP Method: Manual) 5' 6" (1.676 m) 82.5 kg (181 lb 14.1 oz) 29.36 kg/m2       Blood Pressure          Most Recent Value    BP  122/78      Allergies as of 3/6/2017     Lipitor [Atorvastatin]    Penicillins      Immunizations Administered on Date of Encounter - 3/6/2017     None      Orders Placed During Today's Visit      Normal Orders This Visit    POCT URINE DIPSTICK WITHOUT MICROSCOPE     Future Labs/Procedures Expected by Expires    US Pelvis Complete Non OB  3/6/2017 3/6/2018         3/6/2017 11:52 AM - Rachel Green      Component Results     Component    Color    yellow    Spec Grav    1.015    pH, UA    5    WBC, UA    1    Nitrite    neg    Protein    neg    Glucose, UA    neg    Ketones, UA    neg    Urobilinogen    neg    Bilirubin    neg    Blood, UA    neg            [facility] Sign-Up     Activating your [facility] account is as easy as 1-2-3!     1) Visit [email or web address], select Sign Up Now, enter this activation code and your date of birth, then select Next.  [email or web address]  Expires: 4/20/2017 11:59 AM      2) Create a username and password to use when you visit [facility] in the future and " select a security question in case you lose your password and select Next.    3) Enter your e-mail address and click Sign Up!    Additional Information  If you have questions, please e-mail [email or web address] or call [phone] to talk to our [facility] staff. Remember, [facility] is NOT to be used for urgent needs. For medical emergencies, dial 911.         Instructions       1. Prolapse: Stage 2 anterior and posterior vaginal wall prolapse (not very bothersome to patient)  --Pessary # 3 ring without support  Not being used at this time.   PESSARY CARE: Remove pessary as frequently as nightly and as infrequently as every 2-3 weeks. Remove before intercourse. May need to remove before bowel movements if straining dislodges. Wash with soap and water (no ). Replace using small amount of water-based lubricant (like KY jelly) at end being introduced into vagina.    2. 1)  Mixed urinary incontinence, urge > stress:    --Empty bladder every 3 hours.  Empty well: wait a minute, lean forward on toilet.    --Avoid dietary irritants (see sheet).  Keep diary x 3-5 days to determine your irritants.  --KEGELS: do 10 in AM and 10 in PM, holding each x 10 seconds.  When you feel urge to go, STOP, KEGEL, and when urge has passed, then go to bathroom.  Consider PT in future.    --URGE: continue oxybutynin.  For dry mouth: get sour, sugar free lozenge or gum.    --STRESS:  Pessary vs. Sling.     3. Post menopausal bleeding  --pelvic ultrasound ordered  --misses duavee pills often.    4. RTC pending results       Language Assistance Services     ATTENTION: Language assistance services are available, free of charge. Please call [phone].      ATENCIÓN: Si bennettla tyshawn, tiene a barbour disposición servicios gratuitos de asistencia lingüística. Llame al [phone].     CHÚ Ý: N?u b?n nói Ti?ng Vi?t, có các d?ch v? h? tr? ngôn ng? mi?n phí dành cho b?n. G?i s? [phone].         [facility]  complies with applicable Federal civil rights laws and does not discriminate on the basis of race, color, national origin, age, disability, or sex.

## 2017-03-08 ENCOUNTER — HOSPITAL ENCOUNTER (OUTPATIENT)
Dept: RADIOLOGY | Facility: HOSPITAL | Age: 62
Discharge: HOME | End: 2017-03-08
Attending: NURSE PRACTITIONER
Payer: MEDICAID

## 2017-03-08 DIAGNOSIS — N95.0 POST-MENOPAUSAL BLEEDING: ICD-10-CM

## 2017-03-08 PROCEDURE — 76830 TRANSVAGINAL US NON-OB: CPT | Mod: 26,,, | Performed by: RADIOLOGY

## 2017-03-08 PROCEDURE — 76856 US EXAM PELVIC COMPLETE: CPT | Mod: TC

## 2017-03-08 PROCEDURE — 76856 US EXAM PELVIC COMPLETE: CPT | Mod: 26,,, | Performed by: RADIOLOGY

## 2017-03-14 ENCOUNTER — TELEPHONE (OUTPATIENT)
Dept: UROGYNECOLOGY | Facility: CLINIC | Age: 62
End: 2017-03-14

## 2017-03-14 DIAGNOSIS — R93.89 THICKENED ENDOMETRIUM: Primary | ICD-10-CM

## 2017-03-22 ENCOUNTER — PROCEDURE VISIT (OUTPATIENT)
Dept: UROGYNECOLOGY | Facility: CLINIC | Age: 62
End: 2017-03-22
Payer: MEDICAID

## 2017-03-22 VITALS
SYSTOLIC BLOOD PRESSURE: 120 MMHG | DIASTOLIC BLOOD PRESSURE: 70 MMHG | WEIGHT: 182.13 LBS | HEIGHT: 66 IN | BODY MASS INDEX: 29.27 KG/M2

## 2017-03-22 DIAGNOSIS — R93.89 THICKENED ENDOMETRIUM: ICD-10-CM

## 2017-03-22 DIAGNOSIS — N95.0 PMB (POSTMENOPAUSAL BLEEDING): Primary | ICD-10-CM

## 2017-03-22 DIAGNOSIS — N89.8 VAGINAL DISCHARGE: ICD-10-CM

## 2017-03-22 LAB
B-HCG UR QL: NEGATIVE
C TRACH DNA SPEC QL NAA+PROBE: NOT DETECTED
CTP QC/QA: YES
N GONORRHOEA DNA SPEC QL NAA+PROBE: NOT DETECTED

## 2017-03-22 PROCEDURE — 58100 BIOPSY OF UTERUS LINING: CPT | Mod: PBBFAC | Performed by: NURSE PRACTITIONER

## 2017-03-22 PROCEDURE — 87591 N.GONORRHOEAE DNA AMP PROB: CPT

## 2017-03-22 PROCEDURE — 87480 CANDIDA DNA DIR PROBE: CPT

## 2017-03-22 PROCEDURE — 58100 BIOPSY OF UTERUS LINING: CPT | Mod: S$PBB,,, | Performed by: NURSE PRACTITIONER

## 2017-03-22 PROCEDURE — 81025 URINE PREGNANCY TEST: CPT | Mod: PBBFAC | Performed by: NURSE PRACTITIONER

## 2017-03-22 NOTE — PROCEDURES
Endometrial Biopsy-Future  Date/Time: 3/22/2017 12:31 PM  Performed by: ZEUS VUONG  Authorized by: ZEUS VUONG   Preparation: Patient was prepped and draped in the usual sterile fashion.  Local anesthesia used: no    Anesthesia:  Local anesthesia used: no  Comments: Attempted to perform endometrial biopys.  Copious amount of yellow discharge noted-- cervix friable with genprobe.  Genprobe and affirm collected. Will treat if indicated---will reschedule embx.  OLIMPIA Lai-BC

## 2017-03-22 NOTE — MR AVS SNAPSHOT
[facility]  [address] [address]  Phone: [phone]                  Marta Stevens   3/22/2017 11:00 AM   Procedure visit    Description:  Female : 1955   Provider:  Elda Lowery NP   Department:  [facility]           Reason for Visit     Endometrial Biopsy           Diagnoses this Visit        Comments    PMB (postmenopausal bleeding)    -  Primary     Thickened endometrium         Vaginal discharge                To Do List           Goals (5 Years of Data)     None      [facility]     [facility] Care [facility] -  Assistance  Registered nurses in the [facility] provide clinical advisement, health education, appointment booking, and other advisory services.  Call for this free service at [phone].             Medications           Message regarding Medications     Verify the changes and/or additions to your medication regime listed below are the same as discussed with your clinician today.  If any of these changes or additions are incorrect, please notify your healthcare provider.             Verify that the below list of medications is an accurate representation of the medications you are currently taking.  If none reported, the list may be blank. If incorrect, please contact your healthcare provider. Carry this list with you in case of emergency.           Current Medications     atorvastatin (LIPITOR) 40 MG tablet Take 40 mg by mouth once daily.    clotrimazole (LOTRIMIN) 1 % cream Apply topically 2 (two) times daily.    conj estrogens-bazedoxifene (DUAVEE) 0.45-20 mg Tab Take by mouth once daily.    diazePAM (VALIUM) 5 MG tablet Take 5 mg by mouth every 6 (six) hours as needed for Anxiety.    losartan-hydrochlorothiazide 100-12.5 mg (HYZAAR) 100-12.5 mg Tab Take 1 tablet by mouth once daily.    metoprolol succinate (TOPROL-XL) 100 MG 24 hr tablet Take 100 mg by mouth once daily.    metoprolol  "tartrate (LOPRESSOR) 100 MG tablet Take 100 mg by mouth 2 (two) times daily.    omeprazole (PRILOSEC) 40 MG capsule Take 1 capsule (40 mg total) by mouth every morning. 30-45 min before breakfast.    terconazole (TERAZOL 7) 0.4 % Crea Place 1 applicator vaginally once daily.    vitamin D 1000 units Tab Take 185 mg by mouth once daily.    oxybutynin (DITROPAN-XL) 10 MG 24 hr tablet Take 1 tablet (10 mg total) by mouth once daily.           Clinical Reference Information           Your Vitals Were     BP Height Weight BMI       120/70 5' 6" (1.676 m) 82.6 kg (182 lb 1.6 oz) 29.39 kg/m2       Blood Pressure          Most Recent Value    BP  120/70      Allergies as of 3/22/2017     Lipitor [Atorvastatin]    Penicillins      Immunizations Administered on Date of Encounter - 3/22/2017     None      Orders Placed During Today's Visit      Normal Orders This Visit    C. trachomatis/N. gonorrhoeae by AMP DNA Cervix     Endometrial Biopsy-Future     POCT urine pregnancy     Vaginosis Screen by DNA Probe     Future Labs/Procedures Expected by Expires    Endometrial Biopsy-Future  As directed 3/22/2018         3/22/2017 12:05 PM - Rachel Green      Component Results     Component Value Flag Ref Range Units Status    POC Preg Test, Ur Negative  Negative  Final     Acceptable Yes    Final            [facility] Sign-Up     Activating your [facility] account is as easy as 1-2-3!     1) Visit [email or web address], select Sign Up Now, enter this activation code and your date of birth, then select Next.  [email or web address]  Expires: 4/20/2017 12:59 PM      2) Create a username and password to use when you visit [facility] in the future and select a security question in case you lose your password and select Next.    3) Enter your e-mail address and click Sign Up!    Additional Information  If you have questions, please e-mail [email or web address] or call [phone] to talk to our [facility] staff. Remember, [facility] is NOT to " be used for urgent needs. For medical emergencies, dial 911.         Language Assistance Services     ATTENTION: Language assistance services are available, free of charge. Please call [phone].      ATENCIÓN: Si habla tyshawn, tiene a barbour disposición servicios gratuitos de asistencia lingüística. Llame al [phone].     CHÚ Ý: N?u b?n nói Ti?ng Vi?t, có các d?ch v? h? tr? ngôn ng? mi?n phí dành cho b?n. G?i s? [phone].         [facility] complies with applicable Federal civil rights laws and does not discriminate on the basis of race, color, national origin, age, disability, or sex.

## 2017-03-23 ENCOUNTER — TELEPHONE (OUTPATIENT)
Dept: UROGYNECOLOGY | Facility: CLINIC | Age: 62
End: 2017-03-23

## 2017-03-23 DIAGNOSIS — B37.31 YEAST VAGINITIS: Primary | ICD-10-CM

## 2017-03-23 LAB
CANDIDA RRNA VAG QL PROBE: POSITIVE
G VAGINALIS RRNA GENITAL QL PROBE: NEGATIVE
T VAGINALIS RRNA GENITAL QL PROBE: NEGATIVE

## 2017-03-23 RX ORDER — FLUCONAZOLE 150 MG/1
150 TABLET ORAL
Qty: 3 TABLET | Refills: 0 | Status: SHIPPED | OUTPATIENT
Start: 2017-03-23 | End: 2017-03-28

## 2017-03-23 NOTE — TELEPHONE ENCOUNTER
Notified patient of yeast vaginitis.  Rx sent to pharmacy on file  Embx rescheduled.  Elda Lowery, ANGELIAP-BC

## 2017-03-24 ENCOUNTER — TELEPHONE (OUTPATIENT)
Dept: GASTROENTEROLOGY | Facility: CLINIC | Age: 62
End: 2017-03-24

## 2017-03-24 ENCOUNTER — TELEPHONE (OUTPATIENT)
Dept: UROGYNECOLOGY | Facility: CLINIC | Age: 62
End: 2017-03-24

## 2017-03-24 NOTE — TELEPHONE ENCOUNTER
----- Message from Rosy Flores sent at 3/24/2017 11:32 AM CDT -----  Contact: Patient  X _1st Request  _  2nd Request  _  3rd Request    Who:CHARLES PETER [[id number]]    Why:Patient was calling to see if the doctor order anything else besides the 3 pills she wanted to see if she can get some cream as well     What Number to Call Back:Patient can be reached at [phone]    When to Expect a call back: (Before the end of the day)   -- if call after 3:00 call back will be tomorrow.

## 2017-03-24 NOTE — TELEPHONE ENCOUNTER
----- Message from Sukumar Greenwood sent at 3/24/2017 10:02 AM CDT -----  Contact: Pt   PT would like a call back from nurse in ref to rx omeprazole (PRILOSEC) 40 MG capsule    Pt states she has been attempting to have rx filled since Monday 3/20/17    Would like a call back for assistance ASAP    Pt can be reached at [phone]

## 2017-03-24 NOTE — TELEPHONE ENCOUNTER
Spoke with pt regarding refill of Omeprazole.  Spoke with Jyoti of [facility] and ok given for refill of Omeprazole 30 day supply.

## 2017-03-24 NOTE — TELEPHONE ENCOUNTER
Spoke with patient. She was just making sure we only sent in the Diflucan and not any cream. She was worried [facility] may have messed up and didn't give it to her. I reassured her we did not send in any cream, just the tablets.     ARCHANA Camara Lpn

## 2017-04-05 ENCOUNTER — PROCEDURE VISIT (OUTPATIENT)
Dept: UROGYNECOLOGY | Facility: CLINIC | Age: 62
End: 2017-04-05
Payer: MEDICAID

## 2017-04-05 VITALS
SYSTOLIC BLOOD PRESSURE: 110 MMHG | DIASTOLIC BLOOD PRESSURE: 80 MMHG | BODY MASS INDEX: 28.47 KG/M2 | WEIGHT: 176.38 LBS

## 2017-04-05 DIAGNOSIS — R93.89 THICKENED ENDOMETRIUM: ICD-10-CM

## 2017-04-05 DIAGNOSIS — N95.0 PMB (POSTMENOPAUSAL BLEEDING): ICD-10-CM

## 2017-04-05 PROCEDURE — 88305 TISSUE EXAM BY PATHOLOGIST: CPT | Mod: 26,,, | Performed by: PATHOLOGY

## 2017-04-05 PROCEDURE — 88305 TISSUE EXAM BY PATHOLOGIST: CPT | Performed by: PATHOLOGY

## 2017-04-05 PROCEDURE — 58100 BIOPSY OF UTERUS LINING: CPT | Mod: PBBFAC | Performed by: NURSE PRACTITIONER

## 2017-04-05 PROCEDURE — 58100 BIOPSY OF UTERUS LINING: CPT | Mod: S$PBB,,, | Performed by: NURSE PRACTITIONER

## 2017-04-05 NOTE — PROCEDURES
Biopsy (Gynecological)  Date/Time: 4/5/2017 1:00 PM  Performed by: ZEUS VUONG  Authorized by: ZEUS VUONG     Consent Done?:  Yes (Verbal)   Patient was prepped and draped in the normal sterile fashion.  Local anesthesia used?: No      Biopsy Location:  Uterus  Estimated blood loss (cc):  0     PROCEDURE (EMBx):  The patient was placed in dorsal lithotomy position.  A sterile speculum was used to identify the cervix. The ectocervix was prepped x 2 with betadine. The endometrial biopsy pipelle was advanced into the uterine cavity until gentle resistance was met.  3 passes were made, and the specimen was submitted to pathology for further evaluation.   The speculum was removed.  The patient tolerated the procedure well. JUANITA Lai

## 2017-04-10 ENCOUNTER — TELEPHONE (OUTPATIENT)
Dept: UROGYNECOLOGY | Facility: CLINIC | Age: 62
End: 2017-04-10

## 2017-04-10 NOTE — TELEPHONE ENCOUNTER
----- Message from Sachin Greenwood sent at 4/10/2017 10:13 AM CDT -----  Contact: Pt  X_ 1st Request  _ 2nd Request  _ 3rd Request    Who:CHARLES PETER [[id number]]    Why: Patient states she would like to speak with staff in regards to reason why she still bleeding from the biopsy    What Number to Call Back: [phone]    When to Expect a call back: (Before the end of the day)  -- if call after 3:00 call back will be tomorrow.

## 2017-04-10 NOTE — TELEPHONE ENCOUNTER
Pt called with complaints of spotting right now. Sts she just wanted to know how long after her biopsy should she see this. She sts she had more heavier bleeding the day after the procedure. Then it started slacking up a lot. Now it's just spotting. She sts she was just a little concerned. I informed her she could see some spotting like this for about a week after the procedure and possible a little brown spotting. I instructed her if she was to start bleeding with blood running down her legs, then she is to go straight to the ER that is nearest to her. Otherwise, this spotting she is having is pretty normal. She denies any other symptoms including fever @ this time. I will notify Elda of this information as well.    Thanks  ARCHANA Camara Lpn

## 2017-04-21 ENCOUNTER — TELEPHONE (OUTPATIENT)
Dept: UROGYNECOLOGY | Facility: CLINIC | Age: 62
End: 2017-04-21

## 2017-04-21 ENCOUNTER — TELEPHONE (OUTPATIENT)
Dept: GASTROENTEROLOGY | Facility: CLINIC | Age: 62
End: 2017-04-21

## 2017-04-21 DIAGNOSIS — K21.9 GASTROESOPHAGEAL REFLUX DISEASE WITHOUT ESOPHAGITIS: ICD-10-CM

## 2017-04-21 RX ORDER — OMEPRAZOLE 40 MG/1
40 CAPSULE, DELAYED RELEASE ORAL EVERY MORNING
Qty: 30 CAPSULE | Refills: 11 | Status: SHIPPED | OUTPATIENT
Start: 2017-04-21 | End: 2018-01-22 | Stop reason: DRUGHIGH

## 2017-04-21 NOTE — TELEPHONE ENCOUNTER
----- Message from Damaris Andrade MA sent at 4/21/2017  1:31 PM CDT -----  Contact: Self- [phone]      ----- Message -----     From: Ama Goins     Sent: 4/21/2017  10:17 AM       To: Kylee Tian Staff                                  Prescription Refill Request  Name of medication and dose omeprazole (PRILOSEC) 40 MG capsule    Pharmacy [facility] [address]  AT [facility] [address] [phone]     Are you completely out of your medication Yes    Additional Information:  Wants to have several refills with her new rx

## 2017-04-21 NOTE — TELEPHONE ENCOUNTER
Faxed results of EMBX to Dr. Lara @ [phone]. Fax Transmission sent to me in email confirming it went through.     Thanks  ARCHANA Camara Lpn

## 2017-04-21 NOTE — TELEPHONE ENCOUNTER
Mrs. Lozano called in reference to her BX results. I informed her Elda is still in clinic @ this time & I would have her call back when clinic is over. Routing message to Elda.    ARCHANA Camara Lpn

## 2017-04-21 NOTE — TELEPHONE ENCOUNTER
----- Message from Jerrica Cronin sent at 4/21/2017 10:30 AM CDT -----  Contact: self  Pt needing a call back regarding her biopsy results, pt can be reached at [phone].

## 2017-08-24 ENCOUNTER — OFFICE VISIT (OUTPATIENT)
Dept: OPTOMETRY | Facility: CLINIC | Age: 62
End: 2017-08-24
Payer: MEDICAID

## 2017-08-24 DIAGNOSIS — H35.372 EPIRETINAL MEMBRANE, LEFT EYE: ICD-10-CM

## 2017-08-24 DIAGNOSIS — Z13.5 GLAUCOMA SCREENING: ICD-10-CM

## 2017-08-24 DIAGNOSIS — H26.491 PCO (POSTERIOR CAPSULAR OPACIFICATION), RIGHT: Primary | ICD-10-CM

## 2017-08-24 DIAGNOSIS — H52.223 REGULAR ASTIGMATISM OF BOTH EYES: ICD-10-CM

## 2017-08-24 PROCEDURE — 92004 COMPRE OPH EXAM NEW PT 1/>: CPT | Mod: S$PBB,,, | Performed by: OPTOMETRIST

## 2017-08-24 PROCEDURE — 99212 OFFICE O/P EST SF 10 MIN: CPT | Mod: PBBFAC | Performed by: OPTOMETRIST

## 2017-08-24 PROCEDURE — 92015 DETERMINE REFRACTIVE STATE: CPT | Mod: ,,, | Performed by: OPTOMETRIST

## 2017-08-24 PROCEDURE — 99999 PR PBB SHADOW E&M-EST. PATIENT-LVL II: CPT | Mod: PBBFAC,,, | Performed by: OPTOMETRIST

## 2017-08-24 NOTE — LETTER
August 24, 2017      [facility]  [address] [address]           [facility]  [address]  [facility] [address]  Phone: [phone]  Fax: [phone]          Patient: Marta Stevens   MR Number: [id number]   YOB: 1955   Date of Visit: 8/24/2017       Dear [facility]:    Thank you for referring Marta Stevens to me for evaluation. Attached you will find relevant portions of my assessment and plan of care.    If you have questions, please do not hesitate to call me. I look forward to following Marta Stevens along with you.    Sincerely,    Luis Fernando Wren, OD    Enclosure  CC:  No Recipients    If you would like to receive this communication electronically, please contact [email or web address] or [phone] to request more information on [facility] Link access.    For providers and/or their staff who would like to refer a patient to [facility], please contact us through our one-stop-shop provider referral line, Brisa Rivera, at [phone].    If you feel you have received this communication in error or would no longer like to receive these types of communications, please e-mail [email or web address]

## 2017-08-24 NOTE — PROGRESS NOTES
HPI      is here for annual HTN eye exam.  Blurry vision near.    Cataract surgery 2010    (+)Flashes peripheral vision  (+)Floaters  (+)Itch, (-)tear, (-)burn, (-)Dryness. (-) OTC Drops   (-)Photophobia  (-)Glare (-)diplopia (+) headaches        Last edited by Luis Fernando Wren, OD on 8/24/2017  9:08 AM. (History)            Assessment /Plan     For exam results, see Encounter Report.    PCO (posterior capsular opacification), right  -not visually significant, monitor    Epiretinal membrane, left eye  -monitor at annual    Glaucoma screening  -Monitor with annual eye exam and IOP check    Regular astigmatism of both eyes  Eyeglass Final Rx     Eyeglass Final Rx       Sphere Cylinder Axis Dist VA Add    Right Onida +0.50 165 20/25 +2.50    Left Onida +0.50 175 20/30 +2.50    Type:  SVL    Expiration Date:  8/25/2018                  RTC 1 yr

## 2018-01-22 ENCOUNTER — OFFICE VISIT (OUTPATIENT)
Dept: GASTROENTEROLOGY | Facility: CLINIC | Age: 63
End: 2018-01-22
Payer: MEDICAID

## 2018-01-22 ENCOUNTER — LAB VISIT (OUTPATIENT)
Dept: LAB | Facility: HOSPITAL | Age: 63
End: 2018-01-22
Payer: MEDICAID

## 2018-01-22 VITALS
HEART RATE: 69 BPM | WEIGHT: 164.25 LBS | BODY MASS INDEX: 26.4 KG/M2 | DIASTOLIC BLOOD PRESSURE: 77 MMHG | HEIGHT: 66 IN | SYSTOLIC BLOOD PRESSURE: 112 MMHG

## 2018-01-22 DIAGNOSIS — Z79.899 ENCOUNTER FOR MONITORING LONG-TERM PROTON PUMP INHIBITOR THERAPY: ICD-10-CM

## 2018-01-22 DIAGNOSIS — L29.9 EAR ITCHING: ICD-10-CM

## 2018-01-22 DIAGNOSIS — Z51.81 ENCOUNTER FOR MONITORING LONG-TERM PROTON PUMP INHIBITOR THERAPY: ICD-10-CM

## 2018-01-22 DIAGNOSIS — K21.9 GASTROESOPHAGEAL REFLUX DISEASE WITHOUT ESOPHAGITIS: ICD-10-CM

## 2018-01-22 DIAGNOSIS — K21.9 GASTROESOPHAGEAL REFLUX DISEASE WITHOUT ESOPHAGITIS: Primary | ICD-10-CM

## 2018-01-22 DIAGNOSIS — R05.9 COUGH: ICD-10-CM

## 2018-01-22 DIAGNOSIS — R43.8 BAD TASTE IN MOUTH: ICD-10-CM

## 2018-01-22 LAB
25(OH)D3+25(OH)D2 SERPL-MCNC: 31 NG/ML
MAGNESIUM SERPL-MCNC: 2.1 MG/DL
VIT B12 SERPL-MCNC: 626 PG/ML

## 2018-01-22 PROCEDURE — 99214 OFFICE O/P EST MOD 30 MIN: CPT | Mod: PBBFAC | Performed by: NURSE PRACTITIONER

## 2018-01-22 PROCEDURE — 83735 ASSAY OF MAGNESIUM: CPT

## 2018-01-22 PROCEDURE — 82306 VITAMIN D 25 HYDROXY: CPT

## 2018-01-22 PROCEDURE — 82607 VITAMIN B-12: CPT

## 2018-01-22 PROCEDURE — 99214 OFFICE O/P EST MOD 30 MIN: CPT | Mod: S$PBB,,, | Performed by: NURSE PRACTITIONER

## 2018-01-22 PROCEDURE — 99999 PR PBB SHADOW E&M-EST. PATIENT-LVL IV: CPT | Mod: PBBFAC,,, | Performed by: NURSE PRACTITIONER

## 2018-01-22 PROCEDURE — 36415 COLL VENOUS BLD VENIPUNCTURE: CPT

## 2018-01-22 RX ORDER — OMEPRAZOLE 40 MG/1
40 CAPSULE, DELAYED RELEASE ORAL 2 TIMES DAILY
Qty: 60 CAPSULE | Refills: 3 | Status: ON HOLD | OUTPATIENT
Start: 2018-01-22 | End: 2022-09-01 | Stop reason: HOSPADM

## 2018-01-22 NOTE — PROGRESS NOTES
"    [facility] Note    Reason for Visit:  Gastroesophageal reflux    PCP: Lita Wilkerson     Referring MD:     HPI:  This is a 62 y.o. female here for f/u evaluation of gastroesophageal reflux.  I saw Ms. Stevens in 1/2017. At that time she was experiencing good control of GERD with omeprazole 40 mg once daily.  EGD last month revealed a normal esophagus, a 2 cm hiatal hernia, a normal duodenum and a medium amount of food residue in the stomach. F/u GES was normal. She currently reports she has been taking 40 mg of omeprazole once daily on an empty stomach 1 hr before breakfast. She denies retrosternal pyrosis, regurgitation. If she misses a dose of PPI, she will feel "full". 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 There has also been a dry cough at night. She states she was RX cough syrup and z pack per non [facility] cardiologist for possible URI. With some improvement with mucus and cough s/p abx course x 2. There has been no improvement in bad taste in mouth. She also experiences ear itching nightly.   She denies dysphagia.    Abdominal pain - no  NSAID usage - advil for back pain once weekly.  Hx of BC goody powders 1-2 times per month for HAs  Bowel habits - hx constipation. Currently with 1-2 formed BM/day taking 17 g Miralax once daily.   GI bleeding - denies hematochezia, hematemesis, melena, BRBPR, black/tarry stools, and coffee ground emesis    ROS:  Constitutional: No fevers, no chills, No unintentional weight loss, no fatigue,   ENT: + allergies.   CV: No chest pain, no palpitations, no perif. edema, no sob on exertion  Pulm: No cough, No shortness of breath, no wheezes, no sputum  Ophtho: No vision changes  GI: see HPI; also no nausea, no vomiting, no change in appetite  Derm: No rash  Heme: No lymphadenopathy, No bruising  MSK: No arthritis, + muscle pain (back), no muscle weakness  : No dysuria, No " "hematuria  Endo: No hot or cold intolerance  Neuro: No syncope, No seizure,     Medical History:  has a past medical history of GERD (gastroesophageal reflux disease); HTN (hypertension); MVP (mitral valve prolapse); and Nodules of vocal cords.    Surgical History:  has a past surgical history that includes Finger surgery (Right); Colonoscopy; and Esophagogastroduodenoscopy.    Family History: family history includes Breast cancer in her mother and sister; Colon cancer in her sister..     Social History:  reports that she has quit smoking. Her smoking use included Cigarettes. She has never used smokeless tobacco. She reports that she drinks about 0.6 oz of alcohol per week . She reports that she does not use drugs.    Review of patient's allergies indicates:   Allergen Reactions    Penicillins        Current Outpatient Prescriptions   Medication Sig    atorvastatin (LIPITOR) 40 MG tablet Take 40 mg by mouth once daily.    clotrimazole (LOTRIMIN) 1 % cream Apply topically 2 (two) times daily.    conj estrogens-bazedoxifene (DUAVEE) 0.45-20 mg Tab Take by mouth once daily.    diazePAM (VALIUM) 5 MG tablet Take 5 mg by mouth every 6 (six) hours as needed for Anxiety.    losartan-hydrochlorothiazide 100-12.5 mg (HYZAAR) 100-12.5 mg Tab Take 1 tablet by mouth once daily.    metoprolol succinate (TOPROL-XL) 100 MG 24 hr tablet Take 100 mg by mouth once daily.    prednisoLONE (PEDIAPRED) 5 mg/5 mL Soln Take 1 mg/kg by mouth once daily.    vitamin D 1000 units Tab Take 185 mg by mouth once daily.    omeprazole (PRILOSEC) 40 MG capsule Take 1 capsule (40 mg total) by mouth 2 (two) times daily. 30-45 minutes before breakgast and dinner every day    oxybutynin (DITROPAN-XL) 10 MG 24 hr tablet Take 1 tablet (10 mg total) by mouth once daily.     No current facility-administered medications for this visit.        Objective Findings:    Vital Signs:  /77   Pulse 69   Ht 5' 6" (1.676 m)   Wt 74.5 kg (164 lb 3.9 " oz)   BMI 26.51 kg/m²   Body mass index is 26.51 kg/m².    Physical Exam:  General Appearance: Well appearing in no acute distress  Head:   Normocephalic, without obvious abnormality  Eyes:    No scleral icterus, EOMI  ENT: Neck supple, Lips, mucosa, and tongue normal; teeth and gums normal  Lungs: CTA bilaterally in anterior and posterior fields, no wheezes, no crackles.  Heart:  Regular rate and rhythm, S1, S2 normal, no murmurs heard  Abdomen: Soft, non tender, non distended with positive bowel sounds in all four quadrants. No hepatosplenomegaly, ascites, or mass  Extremities: 2+ radial pulses, no clubbing, cyanosis or edema  Skin: No rash to exposed areas  Neurologic: A&Ox4      Labs:  No results found for: WBC, HGB, HCT, PLT, CHOL, TRIG, HDL, LDLDIRECT, ALT, AST, NA, K, CL, CREATININE, BUN, CO2, TSH, PSA, INR, GLUF, HGBA1C, MICROALBUR      Endoscopy:    12/28/2016 EGD Dr. Mitchell- normal esophagus. Medium amount of food residue in the stomach. 2 cm HH. Normal duodenum.   Per pt, 7/2016 colonoscopy @ [facility]- polyp removed. Repeat in 5 years  Per pt, 8/22/2014 EGD Dr. Lock @ [facility]. Polyp in the GE junction. Normal duodenum. Normal antrum. Ring in the GE junction.  EGD report reviewed during today's clinic visit. Copies sent to be scanned into pt EMR.  Assessment:  1. Gastroesophageal reflux disease without esophagitis    2. Encounter for monitoring long-term proton pump inhibitor therapy    3. Bad taste in mouth    4. Ear itching    5. Cough           Recommendations:  1. GERD-no pyrosis nor regur. Recently with bad taste in mouth and increase oral mucus (rhiannon am). Increase Omeprazole 40 mg to BID. Referral to ENT. Future considerations include referral to an allergist.  2. Encounter for monitoring long term PPI tx-Pt made aware of slightly increased risk of CAP, C diff infections and decreased mag, vit D and Vit B- 12 levels with long term PPI use. Yearly Mag, B 12 and Vit D levels recomended. Routine  bone mineral densities per PCP recommended. Pt instructed to contact PCP for s/s of lung infection (fever, chills, CP, SOB, and/or productive cough) or GI infection (profuse, watery diarrhea with or without rectal bleeding, severe abd pain,and/or fever. Vitamin D, Mag, B 12 levels today.   3. Bad taste in mouth-increase PPI. Referral to ENT as above.  4,5. referral to ENT.     F/u pending results.         Order summary:  Orders Placed This Encounter    Vitamin D    Vitamin B12    Magnesium    Ambulatory Referral to ENT    omeprazole (PRILOSEC) 40 MG capsule         Thank you so much for allowing me to participate in the care of Martacrystal Varghese-Naga Pichardo, APRN,FNP-C

## 2018-01-24 ENCOUNTER — TELEPHONE (OUTPATIENT)
Dept: GASTROENTEROLOGY | Facility: CLINIC | Age: 63
End: 2018-01-24

## 2018-02-20 ENCOUNTER — TELEPHONE (OUTPATIENT)
Dept: GASTROENTEROLOGY | Facility: CLINIC | Age: 63
End: 2018-02-20

## 2018-02-20 NOTE — TELEPHONE ENCOUNTER
----- Message from Ama Goins sent at 2/20/2018  9:15 AM CST -----  Contact: Self- [phone]  Kylee- pt called to speak with Asmita- has questions as to why she has to stop taking her pressure rx- please call pt back at [phone]

## 2018-02-20 NOTE — TELEPHONE ENCOUNTER
Spoke to pt  She was confused regarding BP meds  Pt advised to contact prescribing provider  Understanding verbalized by pt

## 2021-02-06 ENCOUNTER — HOSPITAL ENCOUNTER (EMERGENCY)
Facility: HOSPITAL | Age: 66
Discharge: HOME | End: 2021-02-06
Attending: EMERGENCY MEDICINE
Payer: MEDICAID

## 2021-02-06 VITALS
HEIGHT: 66 IN | OXYGEN SATURATION: 95 % | HEART RATE: 79 BPM | WEIGHT: 170 LBS | BODY MASS INDEX: 27.32 KG/M2 | TEMPERATURE: 100 F | DIASTOLIC BLOOD PRESSURE: 66 MMHG | SYSTOLIC BLOOD PRESSURE: 106 MMHG | RESPIRATION RATE: 16 BRPM

## 2021-02-06 DIAGNOSIS — R50.9 ACUTE FEBRILE ILLNESS: Primary | ICD-10-CM

## 2021-02-06 DIAGNOSIS — R06.02 SHORTNESS OF BREATH: ICD-10-CM

## 2021-02-06 DIAGNOSIS — R91.1 PULMONARY NODULE: ICD-10-CM

## 2021-02-06 LAB
ALBUMIN SERPL BCP-MCNC: 3.7 G/DL (ref 3.5–5.2)
ALP SERPL-CCNC: 68 U/L (ref 55–135)
ALT SERPL W/O P-5'-P-CCNC: 26 U/L (ref 10–44)
ANION GAP SERPL CALC-SCNC: 12 MMOL/L (ref 8–16)
AST SERPL-CCNC: 27 U/L (ref 10–40)
BASOPHILS # BLD AUTO: 0.07 K/UL (ref 0–0.2)
BASOPHILS NFR BLD: 0.7 % (ref 0–1.9)
BILIRUB SERPL-MCNC: 0.5 MG/DL (ref 0.1–1)
BILIRUB UR QL STRIP: NEGATIVE
BNP SERPL-MCNC: <10 PG/ML (ref 0–99)
BUN SERPL-MCNC: 12 MG/DL (ref 8–23)
CALCIUM SERPL-MCNC: 8.9 MG/DL (ref 8.7–10.5)
CHLORIDE SERPL-SCNC: 103 MMOL/L (ref 95–110)
CLARITY UR REFRACT.AUTO: CLEAR
CO2 SERPL-SCNC: 22 MMOL/L (ref 23–29)
COLOR UR AUTO: ABNORMAL
CREAT SERPL-MCNC: 1 MG/DL (ref 0.5–1.4)
CTP QC/QA: YES
CTP QC/QA: YES
D DIMER PPP IA.FEU-MCNC: 3.14 MG/L FEU
DIFFERENTIAL METHOD: ABNORMAL
EOSINOPHIL # BLD AUTO: 0 K/UL (ref 0–0.5)
EOSINOPHIL NFR BLD: 0.2 % (ref 0–8)
ERYTHROCYTE [DISTWIDTH] IN BLOOD BY AUTOMATED COUNT: 13.7 % (ref 11.5–14.5)
EST. GFR  (AFRICAN AMERICAN): >60 ML/MIN/1.73 M^2
EST. GFR  (NON AFRICAN AMERICAN): 58.4 ML/MIN/1.73 M^2
GLUCOSE SERPL-MCNC: 100 MG/DL (ref 70–110)
GLUCOSE UR QL STRIP: NEGATIVE
HCT VFR BLD AUTO: 41 % (ref 37–48.5)
HGB BLD-MCNC: 13.6 G/DL (ref 12–16)
HGB UR QL STRIP: ABNORMAL
HYALINE CASTS UR QL AUTO: 1 /LPF
IMM GRANULOCYTES # BLD AUTO: 0.1 K/UL (ref 0–0.04)
IMM GRANULOCYTES NFR BLD AUTO: 1 % (ref 0–0.5)
KETONES UR QL STRIP: NEGATIVE
LACTATE SERPL-SCNC: 1.4 MMOL/L (ref 0.5–2.2)
LEUKOCYTE ESTERASE UR QL STRIP: NEGATIVE
LYMPHOCYTES # BLD AUTO: 1.2 K/UL (ref 1–4.8)
LYMPHOCYTES NFR BLD: 11.7 % (ref 18–48)
MCH RBC QN AUTO: 27.8 PG (ref 27–31)
MCHC RBC AUTO-ENTMCNC: 33.2 G/DL (ref 32–36)
MCV RBC AUTO: 84 FL (ref 82–98)
MICROSCOPIC COMMENT: NORMAL
MONOCYTES # BLD AUTO: 1 K/UL (ref 0.3–1)
MONOCYTES NFR BLD: 10.4 % (ref 4–15)
NEUTROPHILS # BLD AUTO: 7.5 K/UL (ref 1.8–7.7)
NEUTROPHILS NFR BLD: 76 % (ref 38–73)
NITRITE UR QL STRIP: NEGATIVE
NRBC BLD-RTO: 0 /100 WBC
PH UR STRIP: 7 [PH] (ref 5–8)
PLATELET # BLD AUTO: 209 K/UL (ref 150–350)
PMV BLD AUTO: 10 FL (ref 9.2–12.9)
POC MOLECULAR INFLUENZA A AGN: NEGATIVE
POC MOLECULAR INFLUENZA B AGN: NEGATIVE
POTASSIUM SERPL-SCNC: 3.7 MMOL/L (ref 3.5–5.1)
PROT SERPL-MCNC: 6.9 G/DL (ref 6–8.4)
PROT UR QL STRIP: NEGATIVE
RBC # BLD AUTO: 4.89 M/UL (ref 4–5.4)
RBC #/AREA URNS AUTO: 1 /HPF (ref 0–4)
SARS-COV-2 RDRP RESP QL NAA+PROBE: NEGATIVE
SARS-COV-2 RNA RESP QL NAA+PROBE: NOT DETECTED
SODIUM SERPL-SCNC: 137 MMOL/L (ref 136–145)
SP GR UR STRIP: 1.01 (ref 1–1.03)
SQUAMOUS #/AREA URNS AUTO: 0 /HPF
TROPONIN I SERPL DL<=0.01 NG/ML-MCNC: <0.006 NG/ML (ref 0–0.03)
URN SPEC COLLECT METH UR: ABNORMAL
WBC # BLD AUTO: 9.84 K/UL (ref 3.9–12.7)
WBC #/AREA URNS AUTO: 1 /HPF (ref 0–5)

## 2021-02-06 PROCEDURE — 25500020 PHARM REV CODE 255: Performed by: EMERGENCY MEDICINE

## 2021-02-06 PROCEDURE — 99284 PR EMERGENCY DEPT VISIT,LEVEL IV: ICD-10-PCS | Mod: CS,,, | Performed by: EMERGENCY MEDICINE

## 2021-02-06 PROCEDURE — 87502 INFLUENZA DNA AMP PROBE: CPT

## 2021-02-06 PROCEDURE — 25000003 PHARM REV CODE 250: Performed by: PHYSICIAN ASSISTANT

## 2021-02-06 PROCEDURE — 84484 ASSAY OF TROPONIN QUANT: CPT

## 2021-02-06 PROCEDURE — U0003 INFECTIOUS AGENT DETECTION BY NUCLEIC ACID (DNA OR RNA); SEVERE ACUTE RESPIRATORY SYNDROME CORONAVIRUS 2 (SARS-COV-2) (CORONAVIRUS DISEASE [COVID-19]), AMPLIFIED PROBE TECHNIQUE, MAKING USE OF HIGH THROUGHPUT TECHNOLOGIES AS DESCRIBED BY CMS-2020-01-R: HCPCS

## 2021-02-06 PROCEDURE — 93010 EKG 12-LEAD: ICD-10-PCS | Mod: ,,, | Performed by: INTERNAL MEDICINE

## 2021-02-06 PROCEDURE — 93005 ELECTROCARDIOGRAM TRACING: CPT

## 2021-02-06 PROCEDURE — U0002 COVID-19 LAB TEST NON-CDC: HCPCS | Performed by: EMERGENCY MEDICINE

## 2021-02-06 PROCEDURE — 83605 ASSAY OF LACTIC ACID: CPT

## 2021-02-06 PROCEDURE — 63600175 PHARM REV CODE 636 W HCPCS: Performed by: PHYSICIAN ASSISTANT

## 2021-02-06 PROCEDURE — 85379 FIBRIN DEGRADATION QUANT: CPT

## 2021-02-06 PROCEDURE — 86803 HEPATITIS C AB TEST: CPT

## 2021-02-06 PROCEDURE — 96374 THER/PROPH/DIAG INJ IV PUSH: CPT

## 2021-02-06 PROCEDURE — 96361 HYDRATE IV INFUSION ADD-ON: CPT

## 2021-02-06 PROCEDURE — 83880 ASSAY OF NATRIURETIC PEPTIDE: CPT

## 2021-02-06 PROCEDURE — 80053 COMPREHEN METABOLIC PANEL: CPT

## 2021-02-06 PROCEDURE — 99284 EMERGENCY DEPT VISIT MOD MDM: CPT | Mod: CS,,, | Performed by: EMERGENCY MEDICINE

## 2021-02-06 PROCEDURE — 85025 COMPLETE CBC W/AUTO DIFF WBC: CPT

## 2021-02-06 PROCEDURE — 99285 EMERGENCY DEPT VISIT HI MDM: CPT | Mod: 25

## 2021-02-06 PROCEDURE — 93010 ELECTROCARDIOGRAM REPORT: CPT | Mod: ,,, | Performed by: INTERNAL MEDICINE

## 2021-02-06 PROCEDURE — 81001 URINALYSIS AUTO W/SCOPE: CPT

## 2021-02-06 PROCEDURE — 94761 N-INVAS EAR/PLS OXIMETRY MLT: CPT

## 2021-02-06 RX ORDER — ACETAMINOPHEN 500 MG
1000 TABLET ORAL
Status: COMPLETED | OUTPATIENT
Start: 2021-02-06 | End: 2021-02-06

## 2021-02-06 RX ORDER — ONDANSETRON 2 MG/ML
4 INJECTION INTRAMUSCULAR; INTRAVENOUS
Status: COMPLETED | OUTPATIENT
Start: 2021-02-06 | End: 2021-02-06

## 2021-02-06 RX ORDER — ONDANSETRON 4 MG/1
4 TABLET, ORALLY DISINTEGRATING ORAL EVERY 8 HOURS PRN
Qty: 12 TABLET | Refills: 0 | Status: ON HOLD | OUTPATIENT
Start: 2021-02-06 | End: 2022-09-01 | Stop reason: HOSPADM

## 2021-02-06 RX ADMIN — ACETAMINOPHEN 1000 MG: 500 TABLET ORAL at 09:02

## 2021-02-06 RX ADMIN — SODIUM CHLORIDE, SODIUM LACTATE, POTASSIUM CHLORIDE, AND CALCIUM CHLORIDE 1779 ML: .6; .31; .03; .02 INJECTION, SOLUTION INTRAVENOUS at 09:02

## 2021-02-06 RX ADMIN — IOHEXOL 100 ML: 350 INJECTION, SOLUTION INTRAVENOUS at 02:02

## 2021-02-06 RX ADMIN — ONDANSETRON 4 MG: 2 INJECTION INTRAMUSCULAR; INTRAVENOUS at 09:02

## 2021-02-08 LAB — HCV AB SERPL QL IA: NEGATIVE

## 2021-12-24 ENCOUNTER — HOSPITAL ENCOUNTER (EMERGENCY)
Facility: HOSPITAL | Age: 66
Discharge: HOME | End: 2021-12-24
Attending: EMERGENCY MEDICINE
Payer: MEDICAID

## 2021-12-24 VITALS
TEMPERATURE: 99 F | RESPIRATION RATE: 15 BRPM | HEART RATE: 106 BPM | OXYGEN SATURATION: 97 % | SYSTOLIC BLOOD PRESSURE: 137 MMHG | DIASTOLIC BLOOD PRESSURE: 90 MMHG

## 2021-12-24 DIAGNOSIS — Z20.822 COVID-19 VIRUS NOT DETECTED: Primary | ICD-10-CM

## 2021-12-24 LAB
CTP QC/QA: YES
SARS-COV-2 RDRP RESP QL NAA+PROBE: NEGATIVE

## 2021-12-24 PROCEDURE — 99284 PR EMERGENCY DEPT VISIT,LEVEL IV: ICD-10-PCS | Mod: CS,,, | Performed by: PHYSICIAN ASSISTANT

## 2021-12-24 PROCEDURE — U0002 COVID-19 LAB TEST NON-CDC: HCPCS | Performed by: EMERGENCY MEDICINE

## 2021-12-24 PROCEDURE — 99282 EMERGENCY DEPT VISIT SF MDM: CPT | Mod: 25

## 2021-12-24 PROCEDURE — 99284 EMERGENCY DEPT VISIT MOD MDM: CPT | Mod: CS,,, | Performed by: PHYSICIAN ASSISTANT

## 2021-12-24 NOTE — DISCHARGE INSTRUCTIONS
Your coronavirus test was NEGATIVE. This could be a false negative as the test is not 100% accurate. Given your symptoms, you should assume you have the virus and should act accordingly. Take over-the-counter medications such as Tylenol, Ibuprofen, Mucinex, Flonase, etc for further management of your symptoms. Hydrate by drinking plenty of water. Get plenty of sleep.    Starting now, you should self quarantine for at least 10 days. You should wear a mask when going out in public and avoid contact with people who are immunocompromised, pregnant or elderly.    Wash your hands frequently.  Do not attend public events until health authorities  otherwise.    So far from what we know about coronavirus, the people who get very sick tend to do so around day 8 of the illness.  If you rapidly worsen, you should return to the emergency department for reassessment.    Return to the Emergency Department for symptoms including but not limited to: worsening symptoms, shortness of breath or chest pain, vomiting with inability to hold down fluids, passing out/fainting/unconsciousness, or other concerning symptoms.

## 2021-12-24 NOTE — ED NOTES
Marta Stevens, a 66 y.o. female presents to the ED w/ complaint of covid concerns. Pt reports that her daughter tested positive. Denies any other complaints.    Triage note:  Chief Complaint   Patient presents with    COVID-19 Concerns     States daughter tested positive     Review of patient's allergies indicates:   Allergen Reactions    Lipitor [atorvastatin] Itching    Penicillins      Past Medical History:   Diagnosis Date    GERD (gastroesophageal reflux disease)     HTN (hypertension)     MVP (mitral valve prolapse)     Nodules of vocal cords      Patient identifiers verified and correct for Marta Stevens    LOC: The patient is awake, alert, and aware of environment. The patient is AOX4 and speaking appropriately.   APPEARANCE: No acute distress noted.   HEENT: WDL, PERRLA  PSYCHOSOCIAL: Patient is calm and cooperative. Denies SI/HI.  SKIN: The skin is warm, dry, color consistent with ethnicity. No breakdown or brusing visible.  RESPIRATORY: Airway is open and patent. Bilateral chest rise and fall. Respiratory rate even and unlabored.  No accessory muscle use noted.  CARDIAC: Patient has a normal rate and rhythm. No complaints of chest pain.  ABDOMEN/GI: Soft, non tender. No distention noted. Denies n/v/d.   URINARY:  Voids independently without difficulty. No complaints of frequency, urgency, burning, or blood in urine.   NEUROLOGIC: Eyes open spontaneously. Speech clear.  Able to follow commands, demonstrating ability to actively and appropriately communicate within context of current conversation. Symmetrical facial muscles. Movement is purposeful. Denies dizziness/lightheadedness.  MUSCULOSKELETAL: No obvious deformities noted. Full ROM in all extremities.  PERIPHERAL VASCULAR: Cap refill <3 secs bilaterally. No peripheral edema noted. Denies numbness and tingling in extremities.

## 2021-12-24 NOTE — ED PROVIDER NOTES
Encounter Date: 12/24/2021       History     Chief Complaint   Patient presents with    COVID-19 Concerns     States daughter tested positive     The history is provided by the patient and medical records. No  was used.      Marta Stevens is a 66 y.o. female with medical history of GERD, HTN, MVP presenting to the ED with the chief complaint of COVID-19 concerns.     Patient requesting COVID testing after hearing her daughter recently tested positive. She reports feeling at her baseline health and denies any medical complaints. She would not be in the ED otherwise. Reports prior vaccination.             Review of patient's allergies indicates:   Allergen Reactions    Lipitor [atorvastatin] Itching    Penicillins      Past Medical History:   Diagnosis Date    GERD (gastroesophageal reflux disease)     HTN (hypertension)     MVP (mitral valve prolapse)     Nodules of vocal cords      Past Surgical History:   Procedure Laterality Date    COLONOSCOPY      ESOPHAGOGASTRODUODENOSCOPY      FINGER SURGERY Right      Family History   Problem Relation Age of Onset    Breast cancer Mother     Breast cancer Sister     Colon cancer Sister         unsure of age at dx    Vaginal cancer Neg Hx     Endometrial cancer Neg Hx     Cervical cancer Neg Hx     Ovarian cancer Neg Hx     Cancer Neg Hx     Esophageal cancer Neg Hx     Stomach cancer Neg Hx     Rectal cancer Neg Hx     Irritable bowel syndrome Neg Hx     Crohn's disease Neg Hx     Ulcerative colitis Neg Hx     Celiac disease Neg Hx      Social History     Tobacco Use    Smoking status: Former Smoker     Types: Cigarettes    Smokeless tobacco: Never Used   Substance Use Topics    Alcohol use: Yes     Alcohol/week: 1.0 standard drink     Types: 1 Shots of liquor per week     Comment: occasional    Drug use: No     Review of Systems   Constitutional: Negative for fever.   HENT: Negative for sore throat.    Eyes: Negative for pain.    Respiratory: Negative for shortness of breath.    Cardiovascular: Negative for chest pain.   Gastrointestinal: Negative for abdominal pain.   Genitourinary: Negative for dysuria.   Musculoskeletal: Negative for neck pain.   Skin: Negative for wound.   Allergic/Immunologic: Negative for immunocompromised state.   Neurological: Negative for headaches.       Physical Exam     Initial Vitals [12/24/21 1408]   BP Pulse Resp Temp SpO2   (!) 137/90 106 15 98.9 °F (37.2 °C) 97 %      MAP       --         Physical Exam    Constitutional: She appears well-developed and well-nourished. She is not diaphoretic. No distress.   HENT:   Head: Normocephalic and atraumatic.   Eyes: EOM are normal. Pupils are equal, round, and reactive to light.   Neck: Neck supple.   Normal range of motion.  Cardiovascular: Normal rate and regular rhythm.   Pulmonary/Chest: No respiratory distress.   Speaking full sentences without difficulty. No accessory muscle use.   Musculoskeletal:         General: Normal range of motion.      Cervical back: Normal range of motion and neck supple.     Neurological: She is alert and oriented to person, place, and time.   Skin: Skin is warm and dry. No rash noted.         ED Course   Procedures  Labs Reviewed   SARS-COV-2 RDRP GENE    Narrative:     This test utilizes isothermal nucleic acid amplification   technology to detect the SARS-CoV-2 RdRp nucleic acid segment.   The analytical sensitivity (limit of detection) is 125 genome   equivalents/mL.   A POSITIVE result implies infection with the SARS-CoV-2 virus;   the patient is presumed to be contagious.     A NEGATIVE result means that SARS-CoV-2 nucleic acids are not   present above the limit of detection. A NEGATIVE result should be   treated as presumptive. It does not rule out the possibility of   COVID-19 and should not be the sole basis for treatment decisions.   If COVID-19 is strongly suspected based on clinical and exposure   history, re-testing  using an alternate molecular assay should be   considered.   This test is only for use under the Food and Drug   Administration s Emergency Use Authorization (EUA).   Commercial kits are provided by [employer].   Performance characteristics of the EUA have been independently   verified by [facility] Department of   Pathology and Laboratory Medicine.   _________________________________________________________________   The authorized Fact Sheet for Healthcare Providers and the authorized Fact   Sheet for Patients of the ID NOW COVID-19 are available on the FDA   website:     https://www.fda.gov/media/948720/download  https://www.fda.gov/media/184833/download              Imaging Results    None          Medications - No data to display  Medical Decision Making:   History:   Old Medical Records: I decided to obtain old medical records.  Old Records Summarized: records from clinic visits.  Clinical Tests:   Lab Tests: Ordered and Reviewed       APC / Resident Notes:   66 y.o. female with medical history of GERD, HTN, MVP presenting to the ED c/o COVID concerns. Reports daughter tested positive and she is requesting testing. Reports feeling at her baseline health and would not be in the ED otherwise. DDx includes but not limited to viral syndrome, COVID-19, pneumonia, reactive airway disease.    COVID-19 negative. COVID education provided. Discussed other ways for COVID screening besides coming to the ED that are safer. Patient expresses understanding and agreeable to the plan. Return to ED precautions given for new, worsening, or concerning symptoms.                    Clinical Impression:   Final diagnoses:  [Z20.822] COVID-19 virus not detected (Primary)          ED Disposition Condition    Discharge Stable        ED Prescriptions     None        Follow-up Information     Follow up With Specialties Details Why Contact Info    Lita Wilkerson DO Gynecology, Urology   [address] [place] [address]  [address]  [facility] [address]  [phone]             Braden Perez PA-C  12/24/21 3721

## 2022-06-25 ENCOUNTER — HOSPITAL ENCOUNTER (EMERGENCY)
Facility: HOSPITAL | Age: 67
Discharge: HOME | End: 2022-06-25
Attending: EMERGENCY MEDICINE
Payer: MEDICAID

## 2022-06-25 VITALS
BODY MASS INDEX: 29.82 KG/M2 | TEMPERATURE: 100 F | WEIGHT: 179 LBS | RESPIRATION RATE: 17 BRPM | HEART RATE: 71 BPM | OXYGEN SATURATION: 97 % | HEIGHT: 65 IN | DIASTOLIC BLOOD PRESSURE: 63 MMHG | SYSTOLIC BLOOD PRESSURE: 103 MMHG

## 2022-06-25 DIAGNOSIS — J38.5 LARYNGOSPASM: ICD-10-CM

## 2022-06-25 DIAGNOSIS — M62.838 MUSCLE SPASM: Primary | ICD-10-CM

## 2022-06-25 LAB
BUN SERPL-MCNC: 11 MG/DL (ref 6–30)
CHLORIDE SERPL-SCNC: 109 MMOL/L (ref 95–110)
CREAT SERPL-MCNC: 1.1 MG/DL (ref 0.5–1.4)
GLUCOSE SERPL-MCNC: 117 MG/DL (ref 70–110)
HCT VFR BLD CALC: 40 %PCV (ref 36–54)
POC IONIZED CALCIUM: 1.25 MMOL/L (ref 1.06–1.42)
POC TCO2 (MEASURED): 26 MMOL/L (ref 23–29)
POTASSIUM BLD-SCNC: 4.1 MMOL/L (ref 3.5–5.1)
SAMPLE: ABNORMAL
SODIUM BLD-SCNC: 142 MMOL/L (ref 136–145)

## 2022-06-25 PROCEDURE — 25500020 PHARM REV CODE 255: Performed by: EMERGENCY MEDICINE

## 2022-06-25 PROCEDURE — 99284 PR EMERGENCY DEPT VISIT,LEVEL IV: ICD-10-PCS | Mod: ,,, | Performed by: EMERGENCY MEDICINE

## 2022-06-25 PROCEDURE — 99285 EMERGENCY DEPT VISIT HI MDM: CPT | Mod: 25

## 2022-06-25 PROCEDURE — 25000003 PHARM REV CODE 250: Performed by: EMERGENCY MEDICINE

## 2022-06-25 PROCEDURE — 99284 EMERGENCY DEPT VISIT MOD MDM: CPT | Mod: ,,, | Performed by: EMERGENCY MEDICINE

## 2022-06-25 PROCEDURE — 80047 BASIC METABLC PNL IONIZED CA: CPT

## 2022-06-25 RX ORDER — KETOROLAC TROMETHAMINE 30 MG/ML
10 INJECTION, SOLUTION INTRAMUSCULAR; INTRAVENOUS
Status: DISCONTINUED | OUTPATIENT
Start: 2022-06-25 | End: 2022-06-25

## 2022-06-25 RX ORDER — AMLODIPINE BESYLATE 5 MG/1
5 TABLET ORAL DAILY
Status: ON HOLD | COMMUNITY
End: 2022-09-01 | Stop reason: HOSPADM

## 2022-06-25 RX ORDER — ACETAMINOPHEN 500 MG
1000 TABLET ORAL
Status: DISCONTINUED | OUTPATIENT
Start: 2022-06-25 | End: 2022-06-25 | Stop reason: HOSPADM

## 2022-06-25 RX ORDER — ROSUVASTATIN CALCIUM 5 MG/1
5 TABLET, COATED ORAL DAILY
Status: ON HOLD | COMMUNITY
End: 2022-09-01 | Stop reason: HOSPADM

## 2022-06-25 RX ORDER — SERTRALINE HYDROCHLORIDE 100 MG/1
100 TABLET, FILM COATED ORAL DAILY
Status: ON HOLD | COMMUNITY
End: 2022-09-01 | Stop reason: HOSPADM

## 2022-06-25 RX ADMIN — IOHEXOL 75 ML: 350 INJECTION, SOLUTION INTRAVENOUS at 12:06

## 2022-06-25 NOTE — DISCHARGE INSTRUCTIONS
It is possible that you had a laryngospasm this morning caused by your acid reflux. If this happens again, try to remain calm and extend your neck. These usually last less than 1 min. If this happens again you should try to take fast, shallow breaths (panting) - This can sometimes make the vocal cords open up.  I recommend follow up with ENT for further evaluation as well as your gastroenterologist.    Your CTA is negative for any vascular cause of your pain.

## 2022-06-25 NOTE — ED PROVIDER NOTES
"Encounter Date: 6/25/2022    SCRIBE #1 NOTE: I, Umu Ramirez, am scribing for, and in the presence of,  Faye Mora MD. I have scribed the following portions of the note - Other sections scribed: HPI, ROS, PE.       History     Chief Complaint   Patient presents with    Sore Throat     Time patient was seen by the provider: 9:58 AM      The patient is a 66 y.o. female with co-morbidities including: HTN, GERD, nodules of vocal cords who presents to the ED with a complaint of right neck pain and swelling which worsened this morning. Pt states she thought she was going to die and was crying. The neck pain has been aching for "a while" but worsened and swelled today. She also reports eye redness and coughing up white, slimy mucous this morning which has improved. She also reports an intermittent headache that is mild now. She had difficulty breathing secondary to the swelling and mucous. She denies eating anything right before bed, difficulty swallowing, sore throat, unintentional weight loss, or smoking cigarettes. She reports she had a recent surgery to her vocal cords but it was not done at [facility]-unable to tell me what surgery was done?    The history is provided by the patient and medical records. No  was used.     Review of patient's allergies indicates:   Allergen Reactions    Lipitor [atorvastatin] Itching    Penicillins      Past Medical History:   Diagnosis Date    GERD (gastroesophageal reflux disease)     HTN (hypertension)     MVP (mitral valve prolapse)     Nodules of vocal cords      Past Surgical History:   Procedure Laterality Date    COLONOSCOPY      ESOPHAGOGASTRODUODENOSCOPY      FINGER SURGERY Right      Family History   Problem Relation Age of Onset    Breast cancer Mother     Breast cancer Sister     Colon cancer Sister         unsure of age at dx    Vaginal cancer Neg Hx     Endometrial cancer Neg Hx     Cervical cancer Neg Hx     Ovarian cancer " Neg Hx     Cancer Neg Hx     Esophageal cancer Neg Hx     Stomach cancer Neg Hx     Rectal cancer Neg Hx     Irritable bowel syndrome Neg Hx     Crohn's disease Neg Hx     Ulcerative colitis Neg Hx     Celiac disease Neg Hx      Social History     Tobacco Use    Smoking status: Former Smoker     Types: Cigarettes    Smokeless tobacco: Never Used   Substance Use Topics    Alcohol use: Yes     Alcohol/week: 1.0 standard drink     Types: 1 Shots of liquor per week     Comment: occasional    Drug use: No     Review of Systems   Constitutional: Negative for fever and unexpected weight change.   HENT: Negative for sore throat and trouble swallowing.    Eyes: Positive for redness.   Respiratory: Positive for cough (productive) and shortness of breath.    Cardiovascular: Negative for chest pain.   Gastrointestinal: Negative for nausea.   Genitourinary: Negative for dysuria.   Musculoskeletal: Positive for neck pain (right; with swelling). Negative for back pain.   Skin: Negative for rash.   Neurological: Positive for headaches. Negative for weakness.   Hematological: Does not bruise/bleed easily.       Physical Exam     Initial Vitals [06/25/22 0947]   BP Pulse Resp Temp SpO2   (!) 118/91 98 16 99.9 °F (37.7 °C) 97 %      MAP       --         Physical Exam    Nursing note and vitals reviewed.  Constitutional: She appears well-developed and well-nourished. She is not diaphoretic. No distress.   HENT:   Head: Normocephalic and atraumatic.   Mouth/Throat: No trismus in the jaw. No posterior oropharyngeal edema or posterior oropharyngeal erythema.   Eyes: EOM are normal. Pupils are equal, round, and reactive to light.   Neck: Neck supple. No thyromegaly present. No tracheal deviation present.   Normal range of motion.  Cardiovascular: Normal rate and regular rhythm.   Pulmonary/Chest: No respiratory distress.   Abdominal: She exhibits no distension.   Musculoskeletal:         General: Normal range of motion.       Cervical back: Normal range of motion and neck supple.     Lymphadenopathy:     She has no cervical adenopathy.   Neurological: She is alert and oriented to person, place, and time. GCS score is 15. GCS eye subscore is 4. GCS verbal subscore is 5. GCS motor subscore is 6.   Skin: Skin is warm and dry.   Psychiatric: She has a normal mood and affect. Her behavior is normal. Judgment and thought content normal.         ED Course   Procedures  Labs Reviewed   ISTAT PROCEDURE - Abnormal; Notable for the following components:       Result Value    POC Glucose 117 (*)     All other components within normal limits          Imaging Results          CTA Head and Neck (xpd) (Final result)  Result time 06/25/22 14:05:53    Final result by Luis Fernando Islas MD (06/25/22 14:05:53)                 Impression:      No acute intracranial process.    No significant stenosis at the carotid bifurcations by NASCET criteria.  No vertebral artery stenosis.  No evidence of dissection.    No major branch stenosis/occlusion at the qxlszs-fv-Nynlyr.      Electronically signed by: Luis Fernando Islas  Date:    06/25/2022  Time:    14:05             Narrative:    EXAMINATION:  CTA HEAD AND NECK (XPD)    CLINICAL HISTORY:  Vertebral artery dissection suspected;right neck pain.    TECHNIQUE:  Non contrast low dose axial images were obtained through the head.  CT angiogram was performed from the level of the jeana to the top of the head following the IV administration of 75mL of Omnipaque 350.   Sagittal and coronal reconstructions and maximum intensity projection reconstructions were performed. Arterial stenosis percentages are based on NASCET measurement criteria.    COMPARISON:  None    FINDINGS:  Head:    There is no evidence of hydrocephalus mass effect intracranial hemorrhage or acute territorial infarct.  The brain parenchyma maintains normal attenuation.  No enhancing intracranial lesion is appreciated.    The visualized sinuses and mastoid  air cells are clear.    CTA:    There is no significant stenosis at the origin of the vessels from the aortic arch or at the origin of the vertebral arteries from the subclavian arteries.  There is no significant stenosis at the carotid bifurcations bilaterally by NASCET criteria.  There is no evidence of vertebral or carotid dissection as clinically questioned.    Intracranially there is no evidence of major branch stenosis/occlusion at the pfbpzd-jq-Fmquvr.  The posterior communicating arteries are prominent bilaterally developmentally.    No aneurysm is identified.  The venous sinuses appear patent.    No soft tissue mass or adenopathy is identified in the neck.                                 Medications   iohexoL (OMNIPAQUE 350) injection 75 mL (75 mLs Intravenous Given 6/25/22 1234)     Medical Decision Making:   History:   Old Medical Records: I decided to obtain old medical records.  Differential Diagnosis:   Laryngospasm, vertebral artery dissection, sialolith  Clinical Tests:   Lab Tests: Ordered and Reviewed  Radiological Study: Ordered and Reviewed  ED Management:  Unclear what occurred at home this morning- sounds somewhat like laryngospasm but she has lingering neck pain and HA  Will rule out dissection with CTA    CT neg, patient remains improved, no neuro deficits, no breathing difficulties  Discharged to home in stable condition, return to ED warnings given, follow up and patient care instructions given.            Scribe Attestation:   Scribe #1: I performed the above scribed service and the documentation accurately describes the services I performed. I attest to the accuracy of the note.                 Clinical Impression:   Final diagnoses:  [M62.838] Muscle spasm (Primary)  [J38.5] Laryngospasm          ED Disposition Condition    Discharge Stable        ED Prescriptions     None        Follow-up Information     Follow up With Specialties Details Why Contact Info Additional Information     Lita Wilkerson,  UroGynecology    [address]  [address]  [facility] [address]  [phone]       [facility] - Emergency Dept Emergency Medicine  As needed, If symptoms worsen [address] [facility] [address] [phone]     [facility] - [facility] Otolaryngology Schedule an appointment as soon as possible for a visit   [address] [facility] [address] [phone] Ear, Nose & Throat Services - Main [facility], 4th Floor Please park in [facility] and use Clinic elevator           Faye Mora MD  06/28/22 0654

## 2022-06-25 NOTE — ED NOTES
Patient identifiers verified and correct for MS Stevens  C/C:Right neck pain SEE NN  APPEARANCE: awake and alert in NAD.  SKIN: warm, dry and intact. No breakdown or bruising.  MUSCULOSKELETAL: Patient moving all extremities spontaneously, no obvious swelling or deformities noted. Ambulates independently.  RESPIRATORY: Denies shortness of breath.Respirations unlabored. Denies cough, Denies fevers.   CARDIAC: Denies CP, 2+ distal pulses; no peripheral edema  ABDOMEN: S/ND/NT, Denies nausea  : voids spontaneously, denies difficulty  Neurologic: AAO x 4; follows commands equal strength in all extremities; denies numbness/tingling. Denies dizziness Denies wakness

## 2022-06-25 NOTE — ED NOTES
"Patient states right neck pain under ear for a " while" worse today, states she was coughing mucous from her neck, white mucous, denies cough, states "light" headache, denies fever, denies current pain, no OTC meds  "

## 2022-06-25 NOTE — ED NOTES
I-STAT Chem-8+ Results:   Value Reference Range   Sodium 142 136-145 mmol/L   Potassium  4.1 3.5-5.1 mmol/L   Chloride 109  mmol/L   Ionized Calcium 1.25 1.06-1.42 mmol/L   CO2 (measured) 26 23-29 mmol/L   Glucose 117  mg/dL   BUN 11 6-30 mg/dL   Creatinine 1.1 0.5-1.4 mg/dL   Hematocrit 40 36-54%

## 2022-07-02 ENCOUNTER — HOSPITAL ENCOUNTER (EMERGENCY)
Facility: HOSPITAL | Age: 67
Discharge: HOME | End: 2022-07-02
Attending: EMERGENCY MEDICINE
Payer: MEDICAID

## 2022-07-02 VITALS
RESPIRATION RATE: 15 BRPM | SYSTOLIC BLOOD PRESSURE: 141 MMHG | OXYGEN SATURATION: 97 % | HEART RATE: 81 BPM | TEMPERATURE: 99 F | DIASTOLIC BLOOD PRESSURE: 85 MMHG

## 2022-07-02 DIAGNOSIS — R06.7 SNEEZING: Primary | ICD-10-CM

## 2022-07-02 LAB
CTP QC/QA: YES
SARS-COV-2 RDRP RESP QL NAA+PROBE: NEGATIVE

## 2022-07-02 PROCEDURE — 99282 EMERGENCY DEPT VISIT SF MDM: CPT

## 2022-07-02 PROCEDURE — U0002 COVID-19 LAB TEST NON-CDC: HCPCS | Performed by: EMERGENCY MEDICINE

## 2022-07-02 PROCEDURE — 99282 EMERGENCY DEPT VISIT SF MDM: CPT | Mod: CS,,, | Performed by: PHYSICIAN ASSISTANT

## 2022-07-02 PROCEDURE — 99282 PR EMERGENCY DEPT VISIT,LEVEL II: ICD-10-PCS | Mod: CS,,, | Performed by: PHYSICIAN ASSISTANT

## 2022-07-03 NOTE — ED PROVIDER NOTES
Encounter Date: 7/2/2022       History     Chief Complaint   Patient presents with    Nasal Congestion     Pt c/o sneezing attack and runny nose x 1 day. Denies SOB, fever, or cough. States has not been around anyone sick      66-year-old female with history of hypertension, GERD, MVP presents to the ED for nasal congestion and sneezing which started today.  States that she went to the gym and worked out to see if that would help with her sneezing which it did not and presented to the ED.  States that she can taste spices such as salt and pepper her mouth.  Denies any cough, shortness of breath, chest pain, dizziness, nausea vomiting or abdominal pain.  Denies any recent sick contacts.        Review of patient's allergies indicates:   Allergen Reactions    Lipitor [atorvastatin] Itching    Penicillins      Past Medical History:   Diagnosis Date    GERD (gastroesophageal reflux disease)     HTN (hypertension)     MVP (mitral valve prolapse)     Nodules of vocal cords      Past Surgical History:   Procedure Laterality Date    COLONOSCOPY      ESOPHAGOGASTRODUODENOSCOPY      FINGER SURGERY Right      Family History   Problem Relation Age of Onset    Breast cancer Mother     Breast cancer Sister     Colon cancer Sister         unsure of age at dx    Vaginal cancer Neg Hx     Endometrial cancer Neg Hx     Cervical cancer Neg Hx     Ovarian cancer Neg Hx     Cancer Neg Hx     Esophageal cancer Neg Hx     Stomach cancer Neg Hx     Rectal cancer Neg Hx     Irritable bowel syndrome Neg Hx     Crohn's disease Neg Hx     Ulcerative colitis Neg Hx     Celiac disease Neg Hx      Social History     Tobacco Use    Smoking status: Former Smoker     Types: Cigarettes    Smokeless tobacco: Never Used   Substance Use Topics    Alcohol use: Yes     Alcohol/week: 1.0 standard drink     Types: 1 Shots of liquor per week     Comment: occasional    Drug use: No     Review of Systems   Constitutional: Negative  for chills and fever.   HENT: Positive for rhinorrhea. Negative for sore throat.    Respiratory: Negative for cough and shortness of breath.    Cardiovascular: Negative for chest pain.   Gastrointestinal: Negative for abdominal pain, nausea and vomiting.   Genitourinary: Negative for difficulty urinating and dysuria.   Musculoskeletal: Negative.    Skin: Negative.    Neurological: Negative for weakness and headaches.   Psychiatric/Behavioral: Negative for confusion.       Physical Exam     Initial Vitals [07/02/22 1903]   BP Pulse Resp Temp SpO2   (!) 141/85 81 15 99 °F (37.2 °C) 97 %      MAP       --         Physical Exam    Nursing note and vitals reviewed.  Constitutional: She appears well-developed and well-nourished. She is not diaphoretic. No distress.   HENT:   Head: Normocephalic and atraumatic.   Right Ear: External ear normal.   Left Ear: External ear normal.   Nose: Nose normal.   Mouth/Throat: Oropharynx is clear and moist. No oropharyngeal exudate.   Eyes: Conjunctivae are normal. Pupils are equal, round, and reactive to light.   Neck: Neck supple.   Normal range of motion.  Cardiovascular: Normal rate, regular rhythm, normal heart sounds and intact distal pulses. Exam reveals no gallop and no friction rub.    No murmur heard.  Pulmonary/Chest: Breath sounds normal. No respiratory distress.   Abdominal: Abdomen is soft. Bowel sounds are normal. There is no abdominal tenderness.   Musculoskeletal:         General: Normal range of motion.      Cervical back: Normal range of motion and neck supple.     Neurological: She is alert and oriented to person, place, and time. GCS score is 15. GCS eye subscore is 4. GCS verbal subscore is 5. GCS motor subscore is 6.   Skin: Skin is warm and dry. Capillary refill takes less than 2 seconds.   Psychiatric: She has a normal mood and affect.         ED Course   Procedures  Labs Reviewed   SARS-COV-2 RDRP GENE          Imaging Results    None          Medications - No  data to display  Medical Decision Making:   History:   Old Medical Records: I decided to obtain old medical records.  Clinical Tests:   Lab Tests: Ordered and Reviewed       APC / Resident Notes:   66-year-old female presents the ED for rhinorrhea and sneezing x1 day.  Patient denies any fevers/chills, cough, shortness of breath, chest pain nausea vomiting or dizziness.  Patient is nontoxic, in no apparent distress with reassuring vitals.  Screen for COVID-19 which was negative.  Reports that she has a upcoming ENT appointment.  Given her rhinorrhea patient may be developing a viral syndrome.  Will discharge home with instructions for supportive care.       Attending Attestation:     Physician Attestation Statement for NP/PA:   I discussed this assessment and plan of this patient with the NP/PA, but I did not personally examine the patient. The face to face encounter was performed by the NP/PA.                       Clinical Impression:   Final diagnoses:  [R06.7] Sneezing (Primary)          ED Disposition Condition    Discharge Stable        ED Prescriptions     None        Follow-up Information     Follow up With Specialties Details Why Contact Info    Lita Wilkerson DO UroGynecology  Schedule an appointment as soon as possible for a visit  As needed [address] [place] [address]  [phone]             Maximiliano Gonsales PA-C  07/02/22 1958       Breanne Cedillo Jr., MD  07/03/22 2486

## 2022-07-03 NOTE — ED TRIAGE NOTES
Marta Stevens, an 66 y.o. female presents to the ED from home.  Pt c/o constant sneezing that started this morning without relief.  Denies pain.      Chief Complaint   Patient presents with    Nasal Congestion     Pt c/o sneezing attack and runny nose x 1 day. Denies SOB, fever, or cough. States has not been around anyone sick      Review of patient's allergies indicates:   Allergen Reactions    Lipitor [atorvastatin] Itching    Penicillins      Past Medical History:   Diagnosis Date    GERD (gastroesophageal reflux disease)     HTN (hypertension)     MVP (mitral valve prolapse)     Nodules of vocal cords

## 2022-07-03 NOTE — FIRST PROVIDER EVALUATION
Medical screening exam completed.  I have conducted a focused provider triage encounter, findings are as follows:    Brief history of present illness: Sneezing since yesterday. Strange taste in her mouth. No known sick contacts.    Vitals:    07/02/22 1903   BP: (!) 141/85   Pulse: 81   Resp: 15   Temp: 99 °F (37.2 °C)   TempSrc: Oral   SpO2: 97%       Pertinent physical exam:  Speaking complete sentences in no respiratory distress.  Phonation normal.    Brief workup plan:  COVID swab    Preliminary workup initiated; this workup will be continued and followed by the physician or advanced practice provider that is assigned to the patient when roomed.

## 2022-07-06 ENCOUNTER — TELEPHONE (OUTPATIENT)
Dept: OTOLARYNGOLOGY | Facility: CLINIC | Age: 67
End: 2022-07-06
Payer: MEDICAID

## 2022-07-06 NOTE — TELEPHONE ENCOUNTER
Let vm to let pt call back to schedule appt, phone number provided.      ----- Message from Angelia Henry sent at 7/6/2022 12:56 PM CDT -----  Name of Who is Calling: CHARLES PETER [[id number]]           What is the request in detail: Patient is requesting a call back to schedule a NP appointment.  Please assist.           Can the clinic reply by [facility]: NO           What [facility] Back [facility]: [phone]

## 2022-08-26 ENCOUNTER — HOSPITAL ENCOUNTER (EMERGENCY)
Facility: HOSPITAL | Age: 67
Discharge: PSYCHIATRIC OR SUBSTANCE USE FACILITY | End: 2022-08-26
Attending: EMERGENCY MEDICINE
Payer: MEDICARE

## 2022-08-26 VITALS
SYSTOLIC BLOOD PRESSURE: 147 MMHG | HEIGHT: 66 IN | OXYGEN SATURATION: 97 % | WEIGHT: 170 LBS | BODY MASS INDEX: 27.32 KG/M2 | RESPIRATION RATE: 18 BRPM | HEART RATE: 71 BPM | DIASTOLIC BLOOD PRESSURE: 71 MMHG | TEMPERATURE: 98 F

## 2022-08-26 DIAGNOSIS — F22 PARANOIA: Primary | ICD-10-CM

## 2022-08-26 DIAGNOSIS — F30.9 MANIC EPISODE: ICD-10-CM

## 2022-08-26 LAB
ALBUMIN SERPL BCP-MCNC: 4 G/DL (ref 3.5–5.2)
ALP SERPL-CCNC: 105 U/L (ref 55–135)
ALT SERPL W/O P-5'-P-CCNC: 16 U/L (ref 10–44)
AMPHET+METHAMPHET UR QL: NEGATIVE
AMPHET+METHAMPHET UR QL: NEGATIVE
ANION GAP SERPL CALC-SCNC: 7 MMOL/L (ref 8–16)
APAP SERPL-MCNC: <3 UG/ML (ref 10–20)
AST SERPL-CCNC: 20 U/L (ref 10–40)
BACTERIA #/AREA URNS AUTO: NORMAL /HPF
BARBITURATES UR QL SCN>200 NG/ML: NEGATIVE
BARBITURATES UR QL SCN>200 NG/ML: NEGATIVE
BASOPHILS # BLD AUTO: 0.05 K/UL (ref 0–0.2)
BASOPHILS NFR BLD: 0.6 % (ref 0–1.9)
BENZODIAZ UR QL SCN>200 NG/ML: NEGATIVE
BENZODIAZ UR QL SCN>200 NG/ML: NEGATIVE
BILIRUB SERPL-MCNC: 0.7 MG/DL (ref 0.1–1)
BILIRUB UR QL STRIP: NEGATIVE
BUN SERPL-MCNC: 8 MG/DL (ref 8–23)
BZE UR QL SCN: NEGATIVE
BZE UR QL SCN: NEGATIVE
CALCIUM SERPL-MCNC: 9.6 MG/DL (ref 8.7–10.5)
CANNABINOIDS UR QL SCN: NEGATIVE
CANNABINOIDS UR QL SCN: NEGATIVE
CHLORIDE SERPL-SCNC: 111 MMOL/L (ref 95–110)
CLARITY UR REFRACT.AUTO: ABNORMAL
CO2 SERPL-SCNC: 23 MMOL/L (ref 23–29)
COLOR UR AUTO: YELLOW
CREAT SERPL-MCNC: 0.9 MG/DL (ref 0.5–1.4)
CREAT UR-MCNC: 74 MG/DL (ref 15–325)
CREAT UR-MCNC: 76 MG/DL (ref 15–325)
DIFFERENTIAL METHOD: ABNORMAL
EOSINOPHIL # BLD AUTO: 0.2 K/UL (ref 0–0.5)
EOSINOPHIL NFR BLD: 3 % (ref 0–8)
ERYTHROCYTE [DISTWIDTH] IN BLOOD BY AUTOMATED COUNT: 14.1 % (ref 11.5–14.5)
EST. GFR  (NO RACE VARIABLE): >60 ML/MIN/1.73 M^2
ETHANOL SERPL-MCNC: <10 MG/DL
GLUCOSE SERPL-MCNC: 96 MG/DL (ref 70–110)
GLUCOSE UR QL STRIP: NEGATIVE
HCT VFR BLD AUTO: 36.8 % (ref 37–48.5)
HGB BLD-MCNC: 12.9 G/DL (ref 12–16)
HGB UR QL STRIP: NEGATIVE
IMM GRANULOCYTES # BLD AUTO: 0.03 K/UL (ref 0–0.04)
IMM GRANULOCYTES NFR BLD AUTO: 0.4 % (ref 0–0.5)
KETONES UR QL STRIP: NEGATIVE
LEUKOCYTE ESTERASE UR QL STRIP: ABNORMAL
LYMPHOCYTES # BLD AUTO: 2.4 K/UL (ref 1–4.8)
LYMPHOCYTES NFR BLD: 29.9 % (ref 18–48)
MCH RBC QN AUTO: 28.7 PG (ref 27–31)
MCHC RBC AUTO-ENTMCNC: 35.1 G/DL (ref 32–36)
MCV RBC AUTO: 82 FL (ref 82–98)
METHADONE UR QL SCN>300 NG/ML: NEGATIVE
METHADONE UR QL SCN>300 NG/ML: NEGATIVE
MICROSCOPIC COMMENT: NORMAL
MONOCYTES # BLD AUTO: 0.7 K/UL (ref 0.3–1)
MONOCYTES NFR BLD: 9.4 % (ref 4–15)
NEUTROPHILS # BLD AUTO: 4.5 K/UL (ref 1.8–7.7)
NEUTROPHILS NFR BLD: 56.7 % (ref 38–73)
NITRITE UR QL STRIP: NEGATIVE
NRBC BLD-RTO: 0 /100 WBC
OPIATES UR QL SCN: NEGATIVE
OPIATES UR QL SCN: NEGATIVE
PCP UR QL SCN>25 NG/ML: NEGATIVE
PCP UR QL SCN>25 NG/ML: NEGATIVE
PH UR STRIP: 7 [PH] (ref 5–8)
PLATELET # BLD AUTO: 253 K/UL (ref 150–450)
PMV BLD AUTO: 10.4 FL (ref 9.2–12.9)
POTASSIUM SERPL-SCNC: 3.6 MMOL/L (ref 3.5–5.1)
PROT SERPL-MCNC: 7.1 G/DL (ref 6–8.4)
PROT UR QL STRIP: NEGATIVE
RBC # BLD AUTO: 4.5 M/UL (ref 4–5.4)
RBC #/AREA URNS AUTO: 1 /HPF (ref 0–4)
SARS-COV-2 RDRP RESP QL NAA+PROBE: NEGATIVE
SODIUM SERPL-SCNC: 141 MMOL/L (ref 136–145)
SP GR UR STRIP: 1.01 (ref 1–1.03)
SQUAMOUS #/AREA URNS AUTO: 12 /HPF
TOXICOLOGY INFORMATION: NORMAL
TOXICOLOGY INFORMATION: NORMAL
TSH SERPL DL<=0.005 MIU/L-ACNC: 0.58 UIU/ML (ref 0.4–4)
URN SPEC COLLECT METH UR: ABNORMAL
WBC # BLD AUTO: 7.87 K/UL (ref 3.9–12.7)
WBC #/AREA URNS AUTO: 2 /HPF (ref 0–5)

## 2022-08-26 PROCEDURE — 80307 DRUG TEST PRSMV CHEM ANLYZR: CPT | Performed by: EMERGENCY MEDICINE

## 2022-08-26 PROCEDURE — 82077 ASSAY SPEC XCP UR&BREATH IA: CPT | Performed by: EMERGENCY MEDICINE

## 2022-08-26 PROCEDURE — 84443 ASSAY THYROID STIM HORMONE: CPT | Performed by: EMERGENCY MEDICINE

## 2022-08-26 PROCEDURE — 99285 EMERGENCY DEPT VISIT HI MDM: CPT

## 2022-08-26 PROCEDURE — 80143 DRUG ASSAY ACETAMINOPHEN: CPT | Performed by: EMERGENCY MEDICINE

## 2022-08-26 PROCEDURE — 99285 PR EMERGENCY DEPT VISIT,LEVEL V: ICD-10-PCS | Mod: CR,,, | Performed by: PHYSICIAN ASSISTANT

## 2022-08-26 PROCEDURE — 80053 COMPREHEN METABOLIC PANEL: CPT | Performed by: EMERGENCY MEDICINE

## 2022-08-26 PROCEDURE — 81001 URINALYSIS AUTO W/SCOPE: CPT | Mod: 59 | Performed by: EMERGENCY MEDICINE

## 2022-08-26 PROCEDURE — U0002 COVID-19 LAB TEST NON-CDC: HCPCS | Performed by: EMERGENCY MEDICINE

## 2022-08-26 PROCEDURE — 85025 COMPLETE CBC W/AUTO DIFF WBC: CPT | Performed by: EMERGENCY MEDICINE

## 2022-08-26 PROCEDURE — 99285 EMERGENCY DEPT VISIT HI MDM: CPT | Mod: CR,,, | Performed by: PHYSICIAN ASSISTANT

## 2022-08-26 NOTE — ED PROVIDER NOTES
"Encounter Date: 8/26/2022       History     Chief Complaint   Patient presents with    Psychiatric Evaluation     Comes in with [facility] with OPC. Pt has hx of robyn and is + for auditory and visual hallucinations. Pt is paranoid and spent 4 hours in [employer] bathroom scared that she was being followed. Pt is not eating or drinking per family.      65 yo F with HTN, GERD,  Mitral valve prolapse, anxiety, depression presents to the ED  For psychiatric evaluation with OPC in place.   Per OPC filed by patient's daughter Fanny Brice (phone #[phone]): "pt is manic and not with reality. Pt is hallucinating; hearing and seeinpeople that aren't there. Pt is extremely paranoid; thinks people are after her. Pt spent 4 hours in [employer] bathroom scared that she was followed. Pt also disappeared last night, no one knew where she was. Pt is not sleeping at all & is eating very little.     Spoke with patient's daughter states that the patient does not have any other psychiatric diagnoses other than anxiety or depression.  However, patient has had episodes of paranoia and robyn in the past.   No prior psychiatric hospitalizations. Daughter states that the patient has barely been sleeping for "a while".   Daughter states that the patient is afraid of the dark.  She has been very paranoid.  Daughter notes that the patient is Yazdanism at baseline but states that this has been "amplified" recently.   When I spoke with the patient, she continually references the story of Cody and particular Bible verses.  Patient states that she saw a face in the clouds yesterday with eyelashes and lips.  Pt denies SI, HI, hallucinations.  Daughter states that the patient denies that anything is wrong with her.  She is not sure why the police were called to the house.         Review of patient's allergies indicates:   Allergen Reactions    Lipitor [atorvastatin] Itching    Penicillins      Past Medical History:   Diagnosis Date    GERD " (gastroesophageal reflux disease)     HTN (hypertension)     MVP (mitral valve prolapse)     Nodules of vocal cords      Past Surgical History:   Procedure Laterality Date    COLONOSCOPY      ESOPHAGOGASTRODUODENOSCOPY      FINGER SURGERY Right      Family History   Problem Relation Age of Onset    Breast cancer Mother     Breast cancer Sister     Colon cancer Sister         unsure of age at dx    Vaginal cancer Neg Hx     Endometrial cancer Neg Hx     Cervical cancer Neg Hx     Ovarian cancer Neg Hx     Cancer Neg Hx     Esophageal cancer Neg Hx     Stomach cancer Neg Hx     Rectal cancer Neg Hx     Irritable bowel syndrome Neg Hx     Crohn's disease Neg Hx     Ulcerative colitis Neg Hx     Celiac disease Neg Hx      Social History     Tobacco Use    Smoking status: Former Smoker     Types: Cigarettes    Smokeless tobacco: Never Used   Substance Use Topics    Alcohol use: Yes     Alcohol/week: 1.0 standard drink     Types: 1 Shots of liquor per week     Comment: occasional    Drug use: No     Review of Systems   Unable to perform ROS: Psychiatric disorder       Physical Exam     Initial Vitals [08/26/22 1219]   BP Pulse Resp Temp SpO2   (!) 160/92 74 18 97.7 °F (36.5 °C) 97 %      MAP       --         Physical Exam    Nursing note and vitals reviewed.  Constitutional: She appears well-developed and well-nourished. She is not diaphoretic.  Non-toxic appearance. She does not appear ill. No distress.   HENT:   Head: Normocephalic and atraumatic.   Neck: Neck supple.   Cardiovascular: Normal rate and regular rhythm. Exam reveals no gallop and no friction rub.    No murmur heard.  Pulmonary/Chest: Effort normal and breath sounds normal. No accessory muscle usage. No tachypnea. No respiratory distress. She has no decreased breath sounds. She has no wheezes. She has no rhonchi. She has no rales.   Abdominal: She exhibits no distension.   Musculoskeletal:      Cervical back: Neck supple.      Neurological: She is alert.   Skin: No rash noted.   Psychiatric: She has a normal mood and affect. Her behavior is normal.     Patient is pleasant and cooperative.  Speech is slightly pressured.  Patient is hyper-Orthodox and continuously talks about Cody and refers to Bible verses.          ED Course   Procedures  Labs Reviewed   CBC W/ AUTO DIFFERENTIAL - Abnormal; Notable for the following components:       Result Value    Hematocrit 36.8 (*)     All other components within normal limits   COMPREHENSIVE METABOLIC PANEL - Abnormal; Notable for the following components:    Chloride 111 (*)     Anion Gap 7 (*)     All other components within normal limits   URINALYSIS, REFLEX TO URINE CULTURE - Abnormal; Notable for the following components:    Appearance, UA Hazy (*)     Leukocytes, UA Trace (*)     All other components within normal limits    Narrative:     Add on [facility] pre KRISTOPHER BRAND MD  08/26/2022  15:18 [id number]  Specimen Source->Urine   ACETAMINOPHEN LEVEL - Abnormal; Notable for the following components:    Acetaminophen (Tylenol), Serum <3.0 (*)     All other components within normal limits   TSH   ALCOHOL,MEDICAL (ETHANOL)   SARS-COV-2 RNA AMPLIFICATION, QUAL   DRUG SCREEN PANEL, URINE EMERGENCY   URINALYSIS MICROSCOPIC    Narrative:     Add on TAMI BRAND MD  08/26/2022  15:18 [id number]  Specimen Source->Urine   DRUG SCREEN PANEL, URINE EMERGENCY    Narrative:     Specimen Source->Urine   DRUG SCREEN PANEL, URINE EMERGENCY          Imaging Results    None          Medications - No data to display  Medical Decision Making:   History:   Old Medical Records: I decided to obtain old medical records.  Initial Assessment:   65 yo F Presents to the ED with OPC in place for paranoia and hallucinations and concern for manic episode.   Hemodynamically stable.  Differential Diagnosis:   My differential diagnosis includes but is not limited to:   Psychosis, manic episode, mood disorder,  toxicosis, electrolyte abnormality  Clinical Tests:   Lab Tests: Ordered  ED Management:  Based on my discussion with daughter and patient, feel that patient meets PEC criteria for grave disability.   She remained calm and cooperative in the ED.  She is medically cleared for transfer to inpatient psychiatric facility for further management. Daughter is aware of plan and agreeable.                      Medically cleared for psychiatry placement: 8/26/2022  3:58 PM    Clinical Impression:   Final diagnoses:  [F22] Paranoia (Primary)  [F30.9] Manic episode          ED Disposition Condition    Transfer to Psych Facility         ED Prescriptions     None        Follow-up Information    None          Valorie Kim PA-C  08/26/22 0533

## 2022-08-26 NOTE — ED NOTES
Patient undressed and changed into paper scrubs. Patient is cooperative and calm at this time. All personal belongings are locked in hallway closet.     Belongings include: purse with cell phone.   Clothing, shoes, sunglasses.

## 2022-08-26 NOTE — ED NOTES
Belongings---Black purse, sunglasses,white tennis shoes, white srinivas shirt and rust colored srinivas shirt

## 2022-08-28 PROBLEM — K21.9 GERD (GASTROESOPHAGEAL REFLUX DISEASE): Status: ACTIVE | Noted: 2022-08-28

## 2022-08-28 PROBLEM — I10 HTN (HYPERTENSION): Status: ACTIVE | Noted: 2022-08-28

## 2022-10-02 ENCOUNTER — HOSPITAL ENCOUNTER (EMERGENCY)
Facility: HOSPITAL | Age: 67
Discharge: HOME | End: 2022-10-03
Attending: EMERGENCY MEDICINE
Payer: MEDICAID

## 2022-10-02 DIAGNOSIS — Z77.098 EXPOSURE TO CHEMICAL INHALATION: Primary | ICD-10-CM

## 2022-10-02 PROCEDURE — 99283 EMERGENCY DEPT VISIT LOW MDM: CPT | Mod: 25

## 2022-10-02 PROCEDURE — 99284 PR EMERGENCY DEPT VISIT,LEVEL IV: ICD-10-PCS | Mod: ,,, | Performed by: EMERGENCY MEDICINE

## 2022-10-02 PROCEDURE — 99284 EMERGENCY DEPT VISIT MOD MDM: CPT | Mod: ,,, | Performed by: EMERGENCY MEDICINE

## 2022-10-03 VITALS
HEART RATE: 66 BPM | BODY MASS INDEX: 27.44 KG/M2 | RESPIRATION RATE: 18 BRPM | DIASTOLIC BLOOD PRESSURE: 79 MMHG | OXYGEN SATURATION: 97 % | WEIGHT: 170 LBS | SYSTOLIC BLOOD PRESSURE: 135 MMHG | TEMPERATURE: 98 F

## 2022-10-03 PROCEDURE — 25000003 PHARM REV CODE 250: Performed by: EMERGENCY MEDICINE

## 2022-10-03 RX ORDER — CETIRIZINE HYDROCHLORIDE 5 MG/1
10 TABLET ORAL
Status: COMPLETED | OUTPATIENT
Start: 2022-10-03 | End: 2022-10-03

## 2022-10-03 RX ADMIN — CETIRIZINE HYDROCHLORIDE 10 MG: 5 TABLET, FILM COATED ORAL at 02:10

## 2022-10-03 NOTE — ED PROVIDER NOTES
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 Pt now complaining of watery eyes, sore throat, cough, sneeze. Airway protected in triage.     Patient is a 66-year-old female with past medical history of GERD hypertension who presents after inhaling a combination of chemicals including bleach, ammonia, and Mr. Clean.  Patient states she has been sneezing and has a sore throat ever since inhaling the chemicals.  She states they are burning her eyes as well.  She left the room she was cleaning and unfortunately that room does not have a window.  She says she has been letting it air out the house.  She presented because she continued to have a sore throat and was sneezing.    The history is provided by the patient.   Review of patient's allergies indicates:   Allergen Reactions    Lipitor [atorvastatin] Itching    Penicillins      Past Medical History:   Diagnosis Date    GERD (gastroesophageal reflux disease)     HTN (hypertension)     MVP (mitral valve prolapse)     Nodules of vocal cords      Past Surgical History:   Procedure Laterality Date    COLONOSCOPY      ESOPHAGOGASTRODUODENOSCOPY      FINGER SURGERY Right      Family History   Problem Relation Age of Onset    Breast cancer Mother     Breast cancer Sister     Colon cancer Sister         unsure of age at dx    Vaginal cancer Neg Hx     Endometrial cancer Neg Hx     Cervical cancer Neg Hx     Ovarian cancer Neg Hx     Cancer Neg Hx     Esophageal cancer Neg Hx     Stomach cancer Neg Hx     Rectal cancer Neg Hx     Irritable bowel syndrome Neg Hx     Crohn's disease Neg Hx     Ulcerative colitis Neg Hx     Celiac disease Neg Hx      Social History     Tobacco Use    Smoking status: Former     Types: Cigarettes    Smokeless tobacco: Never   Substance Use Topics    Alcohol use: Yes      Alcohol/week: 1.0 standard drink     Types: 1 Shots of liquor per week     Comment: occasional    Drug use: No     Review of Systems  General: No fever.  No chills.  Eyes: No visual changes.  Head: No headache.    Integument: No rashes or lesions.  Chest: + shortness of breath.  Cardiovascular: No chest pain.  Abdomen: No abdominal pain.  No nausea or vomiting.  Urinary: No abnormal urination.  Neurologic: No focal weakness.  No numbness.  Hematologic: No easy bruising.  Endocrine: No excessive thirst or urination.    Physical Exam     Initial Vitals [10/02/22 2332]   BP Pulse Resp Temp SpO2   136/84 73 18 98.7 °F (37.1 °C) 98 %      MAP       --         Physical Exam  Nurses notes reviewed. Vital signs reviewed.  Appearance:  Well-developed.  Well-nourished.  No acute distress.  No diaphoresis  Skin: No rashes seen.  Good turgor.  No abrasions.  No ecchymoses.  Eyes: No conjunctival injection. EOMI.  ENT: normal nose, right external ear normal. Left external ear normal. Posterior oropharynx without redness, edema, or exudate  Chest: No increased respiratory effort.  Lungs are clear  bilaterally  Cardiovascular: Regular rate and rhythm.  Distal pulses intact  Abdomen: Soft.  Not distended.  Nontender.  No guarding.  No rebound.  Musculoskeletal: Good range of motion all joints.  No deformities.  Neck supple.  No meningismus.  Neurologic: Motor intact.  Sensation intact.  Cerebellar intact.  Cranial nerves intact.  Mental Status:  Alert and oriented x 3.  Appropriate, conversant.  Psych: normal mood, normal affect, appropriate behavior, normal insight and judgment    ED Course   Procedures  Labs Reviewed - No data to display         Imaging Results              X-Ray Chest PA And Lateral (Final result)  Result time 10/03/22 01:22:01      Final result by Tyler Olguin DO (10/03/22 01:22:01)                   Impression:      No acute abnormality.      Electronically signed by: Tyler  Clau  Date:    10/03/2022  Time:    01:22               Narrative:    EXAMINATION:  XR CHEST PA AND LATERAL    CLINICAL HISTORY:  Contact with and (suspected) exposure to other hazardous, chiefly nonmedicinal, chemicals    TECHNIQUE:  PA and lateral views of the chest were performed.    COMPARISON:  02/06/2021.    FINDINGS:  The lungs are well expanded and clear. No focal opacities are seen. The pleural spaces are clear.    The cardiac silhouette is unremarkable.    The visualized osseous structures are intact.                                       Medications   cetirizine tablet 10 mg (10 mg Oral Given 10/3/22 0216)     Medical Decision Making:   History:   Old Medical Records: I decided to obtain old medical records.  Old Records Summarized: records from clinic visits.  Differential Diagnosis:   Pneumonitis, viral syndrome, allergic reaction, anaphylaxis, pharyngitis  Clinical Tests:   Radiological Study: Ordered and Reviewed  ED Management:  67 yo F who is well appearing  CXR clear  Does not appear tachypneic  Instructed to properly ventilate area she was cleaning in before returning and to not mix cleaning chemicals  Discharged to home in stable condition, return to ED warnings given, follow up and patient care instructions given.                            Clinical Impression:   Final diagnoses:  [Z77.098] Exposure to chemical inhalation (Primary)        ED Disposition Condition    Discharge Stable          ED Prescriptions    None       Follow-up Information       Follow up With Specialties Details Why Contact Info    Lita Wilkerson DO UroGynecology    [address] [place] [id number]  [phone]               Faye Mora MD  10/04/22 [id number]

## 2022-10-03 NOTE — DISCHARGE INSTRUCTIONS
Do not combine these chemicals while cleaning again.  This can be very dangerous and cause a lethal gas.  I suggest airing out the room very well, open all doors and windows if possible.

## 2022-10-03 NOTE — ED NOTES
Pt alert and oreinted all dc information reviewqed with Pt. Verbalized understanding Pt. Ambulated to exit

## 2022-10-08 ENCOUNTER — PATIENT OUTREACH (OUTPATIENT)
Dept: EMERGENCY MEDICINE | Facility: HOSPITAL | Age: 67
End: 2022-10-08
Payer: MEDICAID

## 2023-03-16 ENCOUNTER — PATIENT OUTREACH (OUTPATIENT)
Dept: EMERGENCY MEDICINE | Facility: OTHER | Age: 68
End: 2023-03-16
Payer: MEDICAID

## 2023-03-16 NOTE — PROGRESS NOTES
Unable to reach by phone for first F/U. Will attempt second F/U at a later date. Closing encounter.

## 2023-04-13 ENCOUNTER — PATIENT OUTREACH (OUTPATIENT)
Dept: EMERGENCY MEDICINE | Facility: OTHER | Age: 68
End: 2023-04-13
Payer: MEDICARE

## 2023-04-13 NOTE — PROGRESS NOTES
Spoke to patient today and she stated she was at work. F/U e-mail sent successfully instructing pt to call with any future needs/concerns.

## 2023-08-12 ENCOUNTER — PATIENT OUTREACH (OUTPATIENT)
Dept: EMERGENCY MEDICINE | Facility: OTHER | Age: 68
End: 2023-08-12
Payer: MEDICARE

## 2024-03-15 DIAGNOSIS — M54.50 LUMBAGO: Primary | ICD-10-CM

## 2024-03-15 DIAGNOSIS — M43.10 ACQUIRED SPONDYLOLISTHESIS: ICD-10-CM

## 2024-03-15 DIAGNOSIS — M50.320 DEGENERATION OF INTERVERTEBRAL DISC OF MID-CERVICAL REGION: ICD-10-CM
